# Patient Record
Sex: MALE | Race: WHITE | NOT HISPANIC OR LATINO | Employment: OTHER | URBAN - NONMETROPOLITAN AREA
[De-identification: names, ages, dates, MRNs, and addresses within clinical notes are randomized per-mention and may not be internally consistent; named-entity substitution may affect disease eponyms.]

---

## 2017-04-10 DIAGNOSIS — I10 HYPERTENSION, GOAL BELOW 140/90: ICD-10-CM

## 2017-04-10 RX ORDER — LISINOPRIL 20 MG/1
20 TABLET ORAL 2 TIMES DAILY
Qty: 180 TABLET | Refills: 1 | Status: SHIPPED | OUTPATIENT
Start: 2017-04-10 | End: 2018-02-19

## 2017-04-12 ENCOUNTER — OFFICE VISIT (OUTPATIENT)
Dept: FAMILY MEDICINE | Facility: CLINIC | Age: 62
End: 2017-04-12
Payer: COMMERCIAL

## 2017-04-12 VITALS
SYSTOLIC BLOOD PRESSURE: 126 MMHG | WEIGHT: 315 LBS | DIASTOLIC BLOOD PRESSURE: 82 MMHG | HEART RATE: 65 BPM | BODY MASS INDEX: 38.36 KG/M2 | TEMPERATURE: 97.9 F | HEIGHT: 76 IN

## 2017-04-12 DIAGNOSIS — I10 ESSENTIAL HYPERTENSION, BENIGN: Primary | ICD-10-CM

## 2017-04-12 PROCEDURE — 99214 OFFICE O/P EST MOD 30 MIN: CPT | Performed by: FAMILY MEDICINE

## 2017-04-12 RX ORDER — CARVEDILOL 6.25 MG/1
6.25 TABLET ORAL 2 TIMES DAILY WITH MEALS
Qty: 60 TABLET | Refills: 5 | Status: SHIPPED | OUTPATIENT
Start: 2017-04-12 | End: 2017-04-13

## 2017-04-12 RX ORDER — CARVEDILOL 6.25 MG/1
6.25 TABLET ORAL 2 TIMES DAILY WITH MEALS
COMMUNITY
End: 2017-04-12

## 2017-04-12 RX ORDER — CARVEDILOL 12.5 MG/1
18.75 TABLET ORAL 2 TIMES DAILY WITH MEALS
Qty: 180 TABLET | Refills: 5 | Status: SHIPPED | OUTPATIENT
Start: 2017-04-12 | End: 2017-04-14

## 2017-04-12 NOTE — NURSING NOTE
"Chief Complaint   Patient presents with     Hyperlipidemia     follow up and refiills       Initial /82 (BP Location: Right arm, Cuff Size: Adult Large)  Pulse 65  Temp 97.9  F (36.6  C) (Tympanic)  Ht 6' 4\" (1.93 m)  Wt (!) 338 lb (153.3 kg)  BMI 41.14 kg/m2 Estimated body mass index is 41.14 kg/(m^2) as calculated from the following:    Height as of this encounter: 6' 4\" (1.93 m).    Weight as of this encounter: 338 lb (153.3 kg).  Medication Reconciliation: complete   Krysten Serna / Certified Medical Assistant......4/12/2017 8:16 AM    "

## 2017-04-12 NOTE — PATIENT INSTRUCTIONS
1. Your BP is great.  Same meds    2. Follow the value with cardiology once a year.  Dr Rosa is good.     3. Call for any other refills.     4. You are doing well. Work on weigh.  =

## 2017-04-12 NOTE — MR AVS SNAPSHOT
"              After Visit Summary   4/12/2017    Stu Kee    MRN: [id number]           Patient Information     Date Of Birth          1955        Visit Information        Provider Department      4/12/2017 8:00 AM Jordan Stevenson MD [facility]        Today's Diagnoses     Essential hypertension, benign    -  1      Care Instructions    1. Your BP is great.  Same meds    2. Follow the value with cardiology once a year.  Dr Rosa is good.     3. Call for any other refills.     4. You are doing well. Work on weigh.          Follow-ups after your visit        Who to contact     If you have questions or need follow up information about today's clinic visit or your schedule please contact [facility] directly at [phone].  Normal or non-critical lab and imaging results will be communicated to you by MyChart, letter or phone within 4 business days after the clinic has received the results. If you do not hear from us within 7 days, please contact the clinic through MyChart or phone. If you have a critical or abnormal lab result, we will notify you by phone as soon as possible.  Submit refill requests through [employer] or call your pharmacy and they will forward the refill request to us. Please allow 3 business days for your refill to be completed.          Additional Information About Your Visit        [employer] Information     [employer] lets you send messages to your doctor, view your test results, renew your prescriptions, schedule appointments and more. To sign up, go to [email or web address] . Click on \"Log in\" on the left side of the screen, which will take you to the Welcome page. Then click on \"Sign up Now\" on the right side of the page.     You will be asked to enter the access code listed below, as well as some personal information. Please follow the directions to create your username and password.     Your access code is: [email or web address]  Expires: 7/11/2017  " "8:36 AM     Your access code will  in 90 days. If you need help or a new code, please call your [place] clinic or [phone].        Care EveryWhere ID     This is your Care EveryWhere ID. This could be used by other organizations to access your [facility] medical records  [id number]        Your Vitals Were     Pulse Temperature Height BMI (Body Mass Index)          65 97.9  F (36.6  C) (Tympanic) 6' 4\" (1.93 m) 41.14 kg/m2         Blood Pressure from Last 3 Encounters:   17 126/82   16 128/77   16 135/82    Weight from Last 3 Encounters:   17 (!) 338 lb (153.3 kg)   16 (!) 335 lb (152 kg)   16 (!) 336 lb 6.4 oz (152.6 kg)              Today, you had the following     No orders found for display         Today's Medication Changes          These changes are accurate as of: 17  8:36 AM.  If you have any questions, ask your nurse or doctor.               These medicines have changed or have updated prescriptions.        Dose/Directions    * carvedilol 6.25 MG tablet   Commonly known as:  COREG   This may have changed:  Another medication with the same name was changed. Make sure you understand how and when to take each.   Used for:  Essential hypertension, benign   Changed by:  Jordan Stevenson MD        Dose:  6.25 mg   Take 1 tablet (6.25 mg) by mouth 2 times daily (with meals) Takes with the 12.5 tab   Quantity:  60 tablet   Refills:  5       * carvedilol 12.5 MG tablet   Commonly known as:  COREG   This may have changed:  additional instructions   Used for:  Essential hypertension, benign   Changed by:  Jordan Stevenson MD        Dose:  18.75 mg   Take 1.5 tablets (18.75 mg) by mouth 2 times daily (with meals) Patient is taking a 12.5 tab and a 6.25 tab bid   Quantity:  180 tablet   Refills:  5       * Notice:  This list has 2 medication(s) that are the same as other medications prescribed for you. Read the directions carefully, and ask your " doctor or other care provider to review them with you.         Where to get your medicines      These medications were sent to [id number] - [place] [place] [facility] [address] [facility]  [address] , [facility] [address]     Phone:  [phone]     carvedilol 12.5 MG tablet    carvedilol 6.25 MG tablet                Primary Care Provider Office Phone # Fax #    Barrie Hope [phone]       [facility] [address] [facility] [address]        Thank you!     Thank you for choosing [facility]  for your care. Our goal is always to provide you with excellent care. Hearing back from our patients is one way we can continue to improve our services. Please take a few minutes to complete the written survey that you may receive in the mail after your visit with us. Thank you!             Your Updated Medication List - Protect others around you: Learn how to safely use, store and throw away your medicines at www.disposemymeds.org.          This list is accurate as of: 4/12/17  8:36 AM.  Always use your most recent med list.                   Brand Name Dispense Instructions for use    allopurinol 100 MG tablet    ZYLOPRIM    90 tablet    Take 1 tablet (100 mg) by mouth daily       ASPIRIN PO      Take 81 mg by mouth daily       * carvedilol 6.25 MG tablet    COREG    60 tablet    Take 1 tablet (6.25 mg) by mouth 2 times daily (with meals) Takes with the 12.5 tab       * carvedilol 12.5 MG tablet    COREG    180 tablet    Take 1.5 tablets (18.75 mg) by mouth 2 times daily (with meals) Patient is taking a 12.5 tab and a 6.25 tab bid       CENTRUM SILVER per tablet      Take 1 tablet by mouth daily       chlorthalidone 25 MG tablet    HYGROTON    90 tablet    Take 1 tablet (25 mg) by mouth daily       lisinopril 20 MG tablet    PRINIVIL/ZESTRIL    180 tablet    Take 1 tablet (20 mg) by mouth 2 times daily       simvastatin 20 MG tablet    ZOCOR    90 tablet    Take 1  tablet (20 mg) by mouth At Bedtime       * Notice:  This list has 2 medication(s) that are the same as other medications prescribed for you. Read the directions carefully, and ask your doctor or other care provider to review them with you.

## 2017-04-12 NOTE — PROGRESS NOTES
SUBJECTIVE:                                                    Stu Kee is a 61 year old male who presents to clinic today for the following health issues: This man comes in the 4 routine follow up of his hyperlipidemia and hypertension. He is status after a aortic valve replacement.  Currently not anticoagulated and has no signs of any ongoing atrial fibrillation which appeared to clear up after his valve replacement he does have a tissue valve in place. He's not seeing cardiology for some time.  Hyperlipidemia Follow-Up      Rate your low fat/cholesterol diet?: not monitoring fat    Taking statin?  Yes, no muscle aches from statin    Other lipid medications/supplements?:  none     Hypertension Follow-up      Outpatient blood pressures are not being checked.    Low Salt Diet: not monitoring salt       Amount of exercise or physical activity: 4-5 days/week for an average of greater than 60 minutes    Problems taking medications regularly: No-but does forget sometimes    Medication side effects: none    Diet: regular (no restrictions)          Problem list and histories reviewed & adjusted, as indicated.  Additional history: as documented    Patient Active Problem List   Diagnosis     CARDIOVASCULAR SCREENING; LDL GOAL LESS THAN 160     Advanced directives, counseling/discussion     Hypertension, goal below 140/90     Aortic valve replaced     Abscess of leg, left s/p I+D      Traumatic open wound of lower leg with infection, left, subsequent encounter     Acute idiopathic gout of left foot     Past Surgical History:   Procedure Laterality Date     APPENDECTOMY       ARTHROSCOPY KNEE RT/LT  age 36    Torn Men Left     LASIK         Social History   Substance Use Topics     Smoking status: Never Smoker     Smokeless tobacco: Never Used     Alcohol use Yes      Comment: 1 drink/month     Family History   Problem Relation Age of Onset     HEART DISEASE Mother      HEART DISEASE Father      valve replaced      CANCER Father      CEREBROVASCULAR DISEASE Maternal Grandmother      Arrhythmia Maternal Grandmother      pacemaker     Arrhythmia Sister      pacemaker     DIABETES Sister      Hypertension Daughter      HEART DISEASE Other      Lung Cancer Sister          Current Outpatient Prescriptions   Medication Sig Dispense Refill     carvedilol (COREG) 6.25 MG tablet Take 1 tablet (6.25 mg) by mouth 2 times daily (with meals) Takes with the 12.5 tab 60 tablet 5     carvedilol (COREG) 12.5 MG tablet Take 1.5 tablets (18.75 mg) by mouth 2 times daily (with meals) Patient is taking a 12.5 tab and a 6.25 tab bid 180 tablet 5     lisinopril (PRINIVIL/ZESTRIL) 20 MG tablet Take 1 tablet (20 mg) by mouth 2 times daily 180 tablet 1     chlorthalidone (HYGROTON) 25 MG tablet Take 1 tablet (25 mg) by mouth daily 90 tablet 3     simvastatin (ZOCOR) 20 MG tablet Take 1 tablet (20 mg) by mouth At Bedtime 90 tablet 3     ASPIRIN PO Take 81 mg by mouth daily        Multiple Vitamins-Minerals (CENTRUM SILVER) per tablet Take 1 tablet by mouth daily       [DISCONTINUED] carvedilol (COREG) 6.25 MG tablet Take 6.25 mg by mouth 2 times daily (with meals) Takes with the 12.5 tab       allopurinol (ZYLOPRIM) 100 MG tablet Take 1 tablet (100 mg) by mouth daily 90 tablet 1     [DISCONTINUED] carvedilol (COREG) 12.5 MG tablet Take 1.5 tablets (18.75 mg) by mouth 2 times daily (with meals) (Patient taking differently: Take 18.75 mg by mouth 2 times daily (with meals) Patient is taking a 12.5 tab and a 6.25 tab bid) 180 tablet 5     Allergies   Allergen Reactions     Amlodipine Besylate Swelling     Lisinopril        Reviewed and updated as needed this visit by clinical staff       Reviewed and updated as needed this visit by Provider         ROS:  C: NEGATIVE for fever, chills, change in weight  E/M: NEGATIVE for ear, mouth and throat problems  R: NEGATIVE for significant cough or SOB  CV: NEGATIVE for chest pain, palpitations or peripheral  "edema    OBJECTIVE:                                                    /82 (BP Location: Right arm, Cuff Size: Adult Large)  Pulse 65  Temp 97.9  F (36.6  C) (Tympanic)  Ht 6' 4\" (1.93 m)  Wt (!) 338 lb (153.3 kg)  BMI 41.14 kg/m2  Body mass index is 41.14 kg/(m^2).  GENERAL: healthy, alert and no distress  NECK: no adenopathy, no asymmetry, masses, or scars and thyroid normal to palpation  RESP: lungs clear to auscultation - no rales, rhonchi or wheezes  CV: He does have a systolic murmur.  ABDOMEN: soft, nontender, no hepatosplenomegaly, no masses and bowel sounds normal  MS: no gross musculoskeletal defects noted, no edema         ASSESSMENT/PLAN:                                                            1. Essential hypertension, benign  Blood pressure is under good control.  - carvedilol (COREG) 6.25 MG tablet; Take 1 tablet (6.25 mg) by mouth 2 times daily (with meals) Takes with the 12.5 tab  Dispense: 60 tablet; Refill: 5  - carvedilol (COREG) 12.5 MG tablet; Take 1.5 tablets (18.75 mg) by mouth 2 times daily (with meals) Patient is taking a 12.5 tab and a 6.25 tab bid  Dispense: 180 tablet; Refill: 5  ASSESSMENT/PLAN:      ICD-10-CM    1. Essential hypertension, benign I10 carvedilol (COREG) 6.25 MG tablet     carvedilol (COREG) 12.5 MG tablet       Patient Instructions   1. Your BP is great.  Same meds    2. Follow the value with cardiology once a year.  Dr Rosa is good.     3. Call for any other refills.     4. You are doing well. Work on weigh.  =              Jordan Stevenson MD  [facility]  "

## 2017-04-13 DIAGNOSIS — I10 ESSENTIAL HYPERTENSION, BENIGN: ICD-10-CM

## 2017-04-13 RX ORDER — CARVEDILOL 6.25 MG/1
6.25 TABLET ORAL 2 TIMES DAILY WITH MEALS
Qty: 180 TABLET | Refills: 1 | Status: SHIPPED | OUTPATIENT
Start: 2017-04-13 | End: 2017-12-06

## 2017-04-13 NOTE — TELEPHONE ENCOUNTER
Coreg 6.25 mg tab one BID refilled (takes along with coreg 12.5 mg BID)  Pt requesting new Rx for coreg 6.25 mg qty 180.  Rx sent to laron Houser RN

## 2017-04-14 DIAGNOSIS — I10 ESSENTIAL HYPERTENSION, BENIGN: ICD-10-CM

## 2017-04-14 RX ORDER — CARVEDILOL 12.5 MG/1
TABLET ORAL
Qty: 180 TABLET | Refills: 1 | Status: SHIPPED | OUTPATIENT
Start: 2017-04-14 | End: 2018-06-07

## 2017-05-08 DIAGNOSIS — I10 HYPERTENSION, GOAL BELOW 140/90: ICD-10-CM

## 2017-05-08 DIAGNOSIS — E78.5 HYPERLIPIDEMIA LDL GOAL <160: ICD-10-CM

## 2017-05-08 RX ORDER — CHLORTHALIDONE 25 MG/1
25 TABLET ORAL DAILY
Qty: 90 TABLET | Refills: 1 | Status: SHIPPED | OUTPATIENT
Start: 2017-05-08 | End: 2018-04-13

## 2017-05-08 RX ORDER — SIMVASTATIN 20 MG
20 TABLET ORAL AT BEDTIME
Qty: 90 TABLET | Refills: 0 | Status: SHIPPED | OUTPATIENT
Start: 2017-05-08 | End: 2017-12-06

## 2017-05-08 NOTE — TELEPHONE ENCOUNTER
chlorthalidone (HYGROTON) 25 MG tablet      Last Written Prescription Date: 7/20/16  Last Fill Quantity: 90, # refills: 3  Last Office Visit with [facility] or [facility] prescribing provider: 4/12/17       Potassium   Date Value Ref Range Status   09/16/2016 3.6 3.4 - 5.3 mmol/L Final     Creatinine   Date Value Ref Range Status   09/16/2016 1.11 0.66 - 1.25 mg/dL Final     BP Readings from Last 3 Encounters:   04/12/17 126/82   12/20/16 128/77   11/16/16 135/82     simvastatin (ZOCOR) 20 MG tablet     Last Written Prescription Date: 7/20/16  Last Fill Quantity: 90, # refills: 3  Last Office Visit with [facility] or [facility] prescribing provider: 4/12/17       No results found for: CHOL  No results found for: HDL  No results found for: LDL  No results found for: TRIG  No results found for: CHOLHDLRATIO

## 2017-05-10 ENCOUNTER — TELEPHONE (OUTPATIENT)
Dept: FAMILY MEDICINE | Facility: CLINIC | Age: 62
End: 2017-05-10

## 2017-05-10 NOTE — TELEPHONE ENCOUNTER
Panel Management Review      Patient has the following on his problem list:       IVD   ASA: Passed    Last LDL:    No results found for: CHOL  No results found for: HDL  No results found for: LDL  No results found for: TRIG   No results found for: CHOLHDLRATIO     Is the patient on a Statin? YES   Is the patient on Aspirin? YES                  Medications     HMG CoA Reductase Inhibitors    simvastatin (ZOCOR) 20 MG tablet    Salicylates    ASPIRIN PO          Last three blood pressure readings:  BP Readings from Last 3 Encounters:   04/12/17 126/82   12/20/16 128/77   11/16/16 135/82        Tobacco History:     History   Smoking Status     Never Smoker   Smokeless Tobacco     Never Used       Hypertension   Last three blood pressure readings:  BP Readings from Last 3 Encounters:   04/12/17 126/82   12/20/16 128/77   11/16/16 135/82     Blood pressure: Passed    HTN Guidelines:  Age 18-59 BP range:  Less than 140/90  Age 60-85 with Diabetes:  Less than 140/90  Age 60-85 without Diabetes:  less than 150/90      Composite cancer screening  Chart review shows that this patient is due/due soon for the following Colonoscopy  Summary:    Patient is due/failing the following:   COLONOSCOPY    Action needed:   Patient needs office visit for colonoscopy.    Type of outreach:    Sent letter.    Questions for provider review:    None                                                                                                                                    Anson Flower CMA       Chart routed to closed .

## 2017-05-10 NOTE — LETTER
[facility]  [address] [facility] [address]  Phone: [phone]    05/10/17    Stu Kee  [address] [facility] [address]      To whom it may concern:     Our records indicate that you have not scheduled for a(n)colonoscopy which was recommended by your health care team. Monitoring and managing your preventative and chronic health conditions are very important to us.     If you have received your health care elsewhere, please provide us with that information so it can be documented in your chart.    Please call [phone] or message us through your [employer] account to schedule an appointment or provide information for your chart.     I look forward to seeing you and working with you on your health care needs.       *If you have already scheduled an appointment, please disregard this reminder      Sincerely,      Barrie Hope MD/ghanshyam

## 2017-07-25 ENCOUNTER — OFFICE VISIT (OUTPATIENT)
Dept: FAMILY MEDICINE | Facility: CLINIC | Age: 62
End: 2017-07-25
Payer: COMMERCIAL

## 2017-07-25 ENCOUNTER — RADIANT APPOINTMENT (OUTPATIENT)
Dept: GENERAL RADIOLOGY | Facility: CLINIC | Age: 62
End: 2017-07-25
Attending: FAMILY MEDICINE
Payer: COMMERCIAL

## 2017-07-25 VITALS
WEIGHT: 315 LBS | HEIGHT: 76 IN | SYSTOLIC BLOOD PRESSURE: 112 MMHG | TEMPERATURE: 98.7 F | DIASTOLIC BLOOD PRESSURE: 72 MMHG | OXYGEN SATURATION: 95 % | HEART RATE: 64 BPM | BODY MASS INDEX: 38.36 KG/M2

## 2017-07-25 DIAGNOSIS — M25.561 ACUTE PAIN OF RIGHT KNEE: ICD-10-CM

## 2017-07-25 DIAGNOSIS — I83.893 VARICOSE VEINS OF BOTH LEGS WITH EDEMA: ICD-10-CM

## 2017-07-25 DIAGNOSIS — M25.561 ACUTE PAIN OF RIGHT KNEE: Primary | ICD-10-CM

## 2017-07-25 PROCEDURE — 99213 OFFICE O/P EST LOW 20 MIN: CPT | Performed by: FAMILY MEDICINE

## 2017-07-25 PROCEDURE — 73560 X-RAY EXAM OF KNEE 1 OR 2: CPT | Mod: RT

## 2017-07-25 NOTE — PROGRESS NOTES
SUBJECTIVE:                                                    Stu Kee is a 61 year old male who presents to clinic today for the following health issues:    Musculoskeletal problem/pain      Duration: 5 days     Description  Location: right knee, no history of trauma/fall or injury,  by profession, stands for long hours during work,     Intensity:  Moderate, 4/10 today     Accompanying signs and symptoms: just hurts     History  Previous similar problem: no   Previous evaluation:  none    Precipitating or alleviating factors:  Trauma or overuse: YES, overuse   Aggravating factors include: standing, walking and climbing stairs    Therapies tried and outcome: ibuprofen with some relief       Problem list and histories reviewed & adjusted, as indicated.      Additional history: as documented    Patient Active Problem List   Diagnosis     CARDIOVASCULAR SCREENING; LDL GOAL LESS THAN 160     Advanced directives, counseling/discussion     Hypertension, goal below 140/90     Aortic valve replaced     Abscess of leg, left s/p I+D      Traumatic open wound of lower leg with infection, left, subsequent encounter     Acute idiopathic gout of left foot     Past Surgical History:   Procedure Laterality Date     APPENDECTOMY       ARTHROSCOPY KNEE RT/LT  age 36    Torn Men Left     LASIK         Social History   Substance Use Topics     Smoking status: Never Smoker     Smokeless tobacco: Never Used     Alcohol use Yes      Comment: 1 drink/month     Family History   Problem Relation Age of Onset     HEART DISEASE Mother      HEART DISEASE Father      valve replaced     CANCER Father      CEREBROVASCULAR DISEASE Maternal Grandmother      Arrhythmia Maternal Grandmother      pacemaker     Arrhythmia Sister      pacemaker     DIABETES Sister      Hypertension Daughter      HEART DISEASE Other      Lung Cancer Sister          Current Outpatient Prescriptions   Medication Sig Dispense Refill     chlorthalidone  "(HYGROTON) 25 MG tablet Take 1 tablet (25 mg) by mouth daily 90 tablet 1     simvastatin (ZOCOR) 20 MG tablet Take 1 tablet (20 mg) by mouth At Bedtime 90 tablet 0     carvedilol (COREG) 12.5 MG tablet take 1 tablet 12.5 mg by mouth twice daily and take 1 tablet 6.25 mg twice daily 180 tablet 1     carvedilol (COREG) 6.25 MG tablet Take 1 tablet (6.25 mg) by mouth 2 times daily (with meals) Takes with the 12.5 tab 180 tablet 1     lisinopril (PRINIVIL/ZESTRIL) 20 MG tablet Take 1 tablet (20 mg) by mouth 2 times daily 180 tablet 1     allopurinol (ZYLOPRIM) 100 MG tablet Take 1 tablet (100 mg) by mouth daily 90 tablet 1     ASPIRIN PO Take 81 mg by mouth daily        Multiple Vitamins-Minerals (CENTRUM SILVER) per tablet Take 1 tablet by mouth daily       Allergies   Allergen Reactions     Amlodipine Besylate Swelling     Lisinopril      Recent Labs   Lab Test  09/16/16   1038  09/12/16   0915   07/23/16   2125   ALT   --    --    --   42   CR  1.11  1.11   < >  1.11   GFRESTIMATED  67  67   < >  67   GFRESTBLACK  81  81   < >  81   POTASSIUM  3.6  3.2*   < >  3.1*    < > = values in this interval not displayed.      BP Readings from Last 3 Encounters:   07/25/17 112/72   04/12/17 126/82   12/20/16 128/77    Wt Readings from Last 3 Encounters:   07/25/17 (!) 341 lb (154.7 kg)   04/12/17 (!) 338 lb (153.3 kg)   12/20/16 (!) 335 lb (152 kg)                  Labs reviewed in EPIC          Reviewed and updated as needed this visit by clinical staff     Reviewed and updated as needed this visit by Provider         ROS:  Constitutional, HEENT, cardiovascular, pulmonary, gi and gu systems are negative, except as otherwise noted.      OBJECTIVE:   /72  Pulse 64  Temp 98.7  F (37.1  C) (Tympanic)  Ht 6' 4\" (1.93 m)  Wt (!) 341 lb (154.7 kg)  SpO2 95%  BMI 41.51 kg/m2  Body mass index is 41.51 kg/(m^2).  GENERAL: healthy, alert and no distress  NECK: no adenopathy, no asymmetry, masses, or scars and thyroid normal " to palpation  RESP: lungs clear to auscultation - no rales, rhonchi or wheezes  CV: regular rate and rhythm, normal S1 S2, no S3 or S4, no murmur, click or rub, no peripheral edema and peripheral pulses strong  ABDOMEN: soft, nontender, no hepatosplenomegaly, no masses and bowel sounds normal  MS: osteoarthritic changes involving both knees, no significant swelling, tenderness or joint laxity noted, varicose veins and pedal edema 1+ bilaterally, sensation to touch and pressure intact, onychomycosis involving toes bilaterally, pedal pulses 3+      ASSESSMENT/PLAN:         ICD-10-CM    1. Acute pain of right knee M25.561 CANCELED: XR Knee Right 3 Views   2. Varicose veins of both legs with edema I83.893        Suspect symptoms likely secondary to right knee sprain and osteoarthritis, x-ray finding discussed, official report pending. Differentials discussed including gout and infection. RICE therapy recommended and suggested over-the-counter analgesia. We'll consider physical therapy/MRI if symptoms persist or worsen. Patient understood and in agreement with the above plan. All questions answered.      Patient Instructions       Knee Sprain    A sprain is an injury to the ligaments or capsule that holds a joint together. There are no broken bones. Most sprains take 3 to 6 weeks to heal. If it a severe sprain where the ligament is completely torn, it can take months to recover.  Most knee sprains are treated with a splint, knee immobilizer brace, or elastic wrap for support. Severe sprains may require surgery.  Home care    Stay off the injured leg as much as possible until you can walk on it without pain. If you have a lot of pain with walking, crutches or a walker may be prescribed. (These can be rented or purchased at many pharmacies and surgical or orthopedic supply stores). Follow your healthcare provider's advice about when to begin putting weight on that leg.    Keep your leg elevated to reduce pain and swelling.  When sleeping, place a pillow under the injured leg. When sitting, support the injured leg so it is level with your waist. This is very important during the first 48 hours.    Apply an ice pack over the injured area for 15 to 20 minutes every 3 to 6 hours. You should do this for the first 24 to 48 hours. You can make an ice pack by filling a plastic bag that seals at the top with ice cubes and then wrapping it with a thin towel. Continue to use ice packs for relief of pain and swelling as needed. As the ice melts, be careful to avoid getting your wrap, splint, or cast wet. After 48 hours, apply heat (warm shower or warm bath) for 15 to 20 minutes several times a day, or alternate ice and heat. You can place the ice pack directly over the splint. If you have to wear a hook-and-loop knee brace, you can open it to apply the ice pack, or heat, directly to the knee. Never put ice directly on the skin. Always wrap the ice in a towel or other type of cloth.    You may use over-the-counter pain medicine to control pain, unless another pain medicine was prescribed.If you have chronic liver or kidney disease or ever had a stomach ulcer or GI bleeding, talk with your healthcare provider before using these medicines.    If you were given a splint, keep it completely dry at all times. Bathe with your splint out of the water, protected with 2 large plastic bags, rubber-banded at the top end. If a fiberglass splint gets wet, you can dry it with a hair dryer. If you have a hook-and-loop knee brace, you can remove this to bathe, unless told otherwise.  Follow-up care  Follow up with your doctor as advised. Any X-rays you had today don t show any broken bones, breaks, or fractures. Sometimes fractures don t show up on the first X-ray. Bruises and sprains can sometimes hurt as much as a fracture. These injuries can take time to heal completely. If your symptoms don t improve or they get worse, talk with your doctor. You may need a  repeat X-ray. If X-rays were taken, you will be told of any new findings that may affect your care.  Call 911  Call 911 if you have:     Shortness of breath     Chest pain  When to seek medical advice  Call your healthcare provider right away if any of these occur:    The splint or knee immobilizer brace becomes wet or soft    The fiberglass cast or splint remains wet for more than 24 hours    Pain or swelling increases    The injured leg or toes become cold, blue, numb, or tingly  Date Last Reviewed: 11/20/2015 2000-2017 [employer]. [address] [place] [address]. All rights reserved. This information is not intended as a substitute for professional medical care. Always follow your healthcare professional's instructions.        Osteoarthritis  Osteoarthritis (also called degenerative joint disease) happens when the cartilage in a joint becomes damaged and worn. This may be due to age, wear and tear, overuse of the joint, or other problems. Osteoarthritis can affect any joint. But it is most common in hands, knees, spine, hips, and feet. Symptoms include joint stiffness, pain, and swelling.  Home care    When a joint is more sore than usual, rest it for a day or two.    Heat can help relieve stiffness. Take a hot bath or apply a heating pad for up to 30 minutes at a time. If symptoms are worse in the morning, using heat just after awakening can help relax the muscle and soothe the joints.     Ice helps relieve pain and swelling. It is often used after activity. Use a cold pack wrapped in a thin cloth on the joint for 10-15 minutes at a time.     Alternating hot and cold can also help relieve pain. Try this for 20 minutes at a time, several times per day.    Exercise helps prevent the muscles and ligaments around the joint from becoming weak. It also helps maintain function in the joint.  Be as active as you can. Talk to your doctor about what activity program is best for you.    Excess  weight puts a lot of extra strain on weight-bearing joints of the lower back, hips, knees, feet and ankles. If you are overweight, talk to your doctor about a safe and effective weight loss program.    Use anti-inflammatory medications as prescribed for pain. This incudes acetaminophen or NSAIDs such as ibuprofen or naproxen. If needed, topical or injected medications may be recommended. Talk to your doctor if these options are not enough to manage your pain.    Talk with your doctor about devices that might help improve your function and reduce pain.  Follow-up care  Follow up with your doctor as advised by our staff.  When to seek medical attention  Call your doctor right away if any of the following occur:    Redness or swelling of a painful joint    Discharge or pus from a painful joint    Fever of 100.4 F (38 C) or higher, or as directed by your healthcare provider    Worsening joint pain    Decreased ability to move the joint or bear weight on the joint  Date Last Reviewed: 4/13/2015 2000-2017 [employer]. [address] [place] [address]. All rights reserved. This information is not intended as a substitute for professional medical care. Always follow your healthcare professional's instructions.        RICE     Rest an injury, elevate it, and use ice and compression as directed.   RICE stands for rest, ice, compression, and elevation. These can limit pain and swelling after an injury. RICE may be recommended to help treat fractures, sprains, strains, and bruises or bumps.   Home care  The following explain the details of RICE:    Rest. Limit the use of the injured body part. This helps prevent further damage to the body part and gives it time to heal. In some cases, you may need a sling, brace, splint, or cast to help keep the body part still until it has healed.    Ice. Applying ice right after an injury helps relieve pain and swelling. Wrap a bag of ice in a thin towel. Then, place  it over the injured area. Do this for 10 to 15 minutes every 3 to 4 hours. Continue for the next 1 to 3 days or until your symptoms improve. Never put ice directly on your skin or ice an area longer than 15 minutes at a time.    Compression. Putting pressure on an injury helps reduce swelling and provides support. Wrap the injured area firmly with an elastic bandage/wrap. Make sure not to wrap the bandage too tightly or you will cut off blood flow to the injured area. If your bandage loosens, rewrap it.    Elevation. Keeping an injury raised above the level of your heart reduces swelling, pain, and throbbing. For instance, if you have a broken leg, it may help to rest your leg on several pillows when sitting or lying down. Try to keep the injured area elevated for at least 2 to 3 hours per day.  Follow-up care  Follow up with your healthcare provider, or as advised.  When to seek medical advice  Call your healthcare provider right away if any of these occur:    Fever of 100.4 F (38 C) or higher, or as directed by your healthcare provider    Increased pain or swelling in the injured body part    Injured body part becomes cold, blue, numb, or tingly    Signs of infection. These include warmth in the skin, redness, drainage, or bad smell coming from the injured body part.  Date Last Reviewed: 1/18/2016 2000-2017 [employer]. [address] [place] [id number]. All rights reserved. This information is not intended as a substitute for professional medical care. Always follow your healthcare professional's instructions.            Orlando Robledo MD  [facility]

## 2017-07-25 NOTE — PATIENT INSTRUCTIONS
Knee Sprain    A sprain is an injury to the ligaments or capsule that holds a joint together. There are no broken bones. Most sprains take 3 to 6 weeks to heal. If it a severe sprain where the ligament is completely torn, it can take months to recover.  Most knee sprains are treated with a splint, knee immobilizer brace, or elastic wrap for support. Severe sprains may require surgery.  Home care    Stay off the injured leg as much as possible until you can walk on it without pain. If you have a lot of pain with walking, crutches or a walker may be prescribed. (These can be rented or purchased at many pharmacies and surgical or orthopedic supply stores). Follow your healthcare provider's advice about when to begin putting weight on that leg.    Keep your leg elevated to reduce pain and swelling. When sleeping, place a pillow under the injured leg. When sitting, support the injured leg so it is level with your waist. This is very important during the first 48 hours.    Apply an ice pack over the injured area for 15 to 20 minutes every 3 to 6 hours. You should do this for the first 24 to 48 hours. You can make an ice pack by filling a plastic bag that seals at the top with ice cubes and then wrapping it with a thin towel. Continue to use ice packs for relief of pain and swelling as needed. As the ice melts, be careful to avoid getting your wrap, splint, or cast wet. After 48 hours, apply heat (warm shower or warm bath) for 15 to 20 minutes several times a day, or alternate ice and heat. You can place the ice pack directly over the splint. If you have to wear a hook-and-loop knee brace, you can open it to apply the ice pack, or heat, directly to the knee. Never put ice directly on the skin. Always wrap the ice in a towel or other type of cloth.    You may use over-the-counter pain medicine to control pain, unless another pain medicine was prescribed.If you have chronic liver or kidney disease or ever had a stomach  ulcer or GI bleeding, talk with your healthcare provider before using these medicines.    If you were given a splint, keep it completely dry at all times. Bathe with your splint out of the water, protected with 2 large plastic bags, rubber-banded at the top end. If a fiberglass splint gets wet, you can dry it with a hair dryer. If you have a hook-and-loop knee brace, you can remove this to bathe, unless told otherwise.  Follow-up care  Follow up with your doctor as advised. Any X-rays you had today don t show any broken bones, breaks, or fractures. Sometimes fractures don t show up on the first X-ray. Bruises and sprains can sometimes hurt as much as a fracture. These injuries can take time to heal completely. If your symptoms don t improve or they get worse, talk with your doctor. You may need a repeat X-ray. If X-rays were taken, you will be told of any new findings that may affect your care.  Call 911  Call 911 if you have:     Shortness of breath     Chest pain  When to seek medical advice  Call your healthcare provider right away if any of these occur:    The splint or knee immobilizer brace becomes wet or soft    The fiberglass cast or splint remains wet for more than 24 hours    Pain or swelling increases    The injured leg or toes become cold, blue, numb, or tingly  Date Last Reviewed: 11/20/2015 2000-2017 [employer]. [address] [place], [place] [id number]. All rights reserved. This information is not intended as a substitute for professional medical care. Always follow your healthcare professional's instructions.        Osteoarthritis  Osteoarthritis (also called degenerative joint disease) happens when the cartilage in a joint becomes damaged and worn. This may be due to age, wear and tear, overuse of the joint, or other problems. Osteoarthritis can affect any joint. But it is most common in hands, knees, spine, hips, and feet. Symptoms include joint stiffness, pain, and  swelling.  Home care    When a joint is more sore than usual, rest it for a day or two.    Heat can help relieve stiffness. Take a hot bath or apply a heating pad for up to 30 minutes at a time. If symptoms are worse in the morning, using heat just after awakening can help relax the muscle and soothe the joints.     Ice helps relieve pain and swelling. It is often used after activity. Use a cold pack wrapped in a thin cloth on the joint for 10-15 minutes at a time.     Alternating hot and cold can also help relieve pain. Try this for 20 minutes at a time, several times per day.    Exercise helps prevent the muscles and ligaments around the joint from becoming weak. It also helps maintain function in the joint.  Be as active as you can. Talk to your doctor about what activity program is best for you.    Excess weight puts a lot of extra strain on weight-bearing joints of the lower back, hips, knees, feet and ankles. If you are overweight, talk to your doctor about a safe and effective weight loss program.    Use anti-inflammatory medications as prescribed for pain. This incudes acetaminophen or NSAIDs such as ibuprofen or naproxen. If needed, topical or injected medications may be recommended. Talk to your doctor if these options are not enough to manage your pain.    Talk with your doctor about devices that might help improve your function and reduce pain.  Follow-up care  Follow up with your doctor as advised by our staff.  When to seek medical attention  Call your doctor right away if any of the following occur:    Redness or swelling of a painful joint    Discharge or pus from a painful joint    Fever of 100.4 F (38 C) or higher, or as directed by your healthcare provider    Worsening joint pain    Decreased ability to move the joint or bear weight on the joint  Date Last Reviewed: 4/13/2015 2000-2017 [employer]. [address] [place] [address], [place] [address]. All rights reserved. This information is  not intended as a substitute for professional medical care. Always follow your healthcare professional's instructions.        RICE     Rest an injury, elevate it, and use ice and compression as directed.   RICE stands for rest, ice, compression, and elevation. These can limit pain and swelling after an injury. RICE may be recommended to help treat fractures, sprains, strains, and bruises or bumps.   Home care  The following explain the details of RICE:    Rest. Limit the use of the injured body part. This helps prevent further damage to the body part and gives it time to heal. In some cases, you may need a sling, brace, splint, or cast to help keep the body part still until it has healed.    Ice. Applying ice right after an injury helps relieve pain and swelling. Wrap a bag of ice in a thin towel. Then, place it over the injured area. Do this for 10 to 15 minutes every 3 to 4 hours. Continue for the next 1 to 3 days or until your symptoms improve. Never put ice directly on your skin or ice an area longer than 15 minutes at a time.    Compression. Putting pressure on an injury helps reduce swelling and provides support. Wrap the injured area firmly with an elastic bandage/wrap. Make sure not to wrap the bandage too tightly or you will cut off blood flow to the injured area. If your bandage loosens, rewrap it.    Elevation. Keeping an injury raised above the level of your heart reduces swelling, pain, and throbbing. For instance, if you have a broken leg, it may help to rest your leg on several pillows when sitting or lying down. Try to keep the injured area elevated for at least 2 to 3 hours per day.  Follow-up care  Follow up with your healthcare provider, or as advised.  When to seek medical advice  Call your healthcare provider right away if any of these occur:    Fever of 100.4 F (38 C) or higher, or as directed by your healthcare provider    Increased pain or swelling in the injured body part    Injured body  part becomes cold, blue, numb, or tingly    Signs of infection. These include warmth in the skin, redness, drainage, or bad smell coming from the injured body part.  Date Last Reviewed: 1/18/2016 2000-2017 [employer]. [address] [place] [address]. All rights reserved. This information is not intended as a substitute for professional medical care. Always follow your healthcare professional's instructions.

## 2017-07-25 NOTE — MR AVS SNAPSHOT
After Visit Summary   7/25/2017    Stu Kee    MRN: [id number]           Patient Information     Date Of Birth          1955        Visit Information        Provider Department      7/25/2017 2:00 PM Orlando Robledo MD [facility]        Today's Diagnoses     Acute pain of right knee    -  1    Varicose veins of both legs with edema          Care Instructions      Knee Sprain    A sprain is an injury to the ligaments or capsule that holds a joint together. There are no broken bones. Most sprains take 3 to 6 weeks to heal. If it a severe sprain where the ligament is completely torn, it can take months to recover.  Most knee sprains are treated with a splint, knee immobilizer brace, or elastic wrap for support. Severe sprains may require surgery.  Home care    Stay off the injured leg as much as possible until you can walk on it without pain. If you have a lot of pain with walking, crutches or a walker may be prescribed. (These can be rented or purchased at many pharmacies and surgical or orthopedic supply stores). Follow your healthcare provider's advice about when to begin putting weight on that leg.    Keep your leg elevated to reduce pain and swelling. When sleeping, place a pillow under the injured leg. When sitting, support the injured leg so it is level with your waist. This is very important during the first 48 hours.    Apply an ice pack over the injured area for 15 to 20 minutes every 3 to 6 hours. You should do this for the first 24 to 48 hours. You can make an ice pack by filling a plastic bag that seals at the top with ice cubes and then wrapping it with a thin towel. Continue to use ice packs for relief of pain and swelling as needed. As the ice melts, be careful to avoid getting your wrap, splint, or cast wet. After 48 hours, apply heat (warm shower or warm bath) for 15 to 20 minutes several times a day, or alternate ice and heat. You can place the ice pack  directly over the splint. If you have to wear a hook-and-loop knee brace, you can open it to apply the ice pack, or heat, directly to the knee. Never put ice directly on the skin. Always wrap the ice in a towel or other type of cloth.    You may use over-the-counter pain medicine to control pain, unless another pain medicine was prescribed.If you have chronic liver or kidney disease or ever had a stomach ulcer or GI bleeding, talk with your healthcare provider before using these medicines.    If you were given a splint, keep it completely dry at all times. Bathe with your splint out of the water, protected with 2 large plastic bags, rubber-banded at the top end. If a fiberglass splint gets wet, you can dry it with a hair dryer. If you have a hook-and-loop knee brace, you can remove this to bathe, unless told otherwise.  Follow-up care  Follow up with your doctor as advised. Any X-rays you had today don t show any broken bones, breaks, or fractures. Sometimes fractures don t show up on the first X-ray. Bruises and sprains can sometimes hurt as much as a fracture. These injuries can take time to heal completely. If your symptoms don t improve or they get worse, talk with your doctor. You may need a repeat X-ray. If X-rays were taken, you will be told of any new findings that may affect your care.  Call 911  Call 911 if you have:     Shortness of breath     Chest pain  When to seek medical advice  Call your healthcare provider right away if any of these occur:    The splint or knee immobilizer brace becomes wet or soft    The fiberglass cast or splint remains wet for more than 24 hours    Pain or swelling increases    The injured leg or toes become cold, blue, numb, or tingly  Date Last Reviewed: 11/20/2015 2000-2017 [employer]. [address] [place] [address], [place] [address]. All rights reserved. This information is not intended as a substitute for professional medical care. Always follow your healthcare  professional's instructions.        Osteoarthritis  Osteoarthritis (also called degenerative joint disease) happens when the cartilage in a joint becomes damaged and worn. This may be due to age, wear and tear, overuse of the joint, or other problems. Osteoarthritis can affect any joint. But it is most common in hands, knees, spine, hips, and feet. Symptoms include joint stiffness, pain, and swelling.  Home care    When a joint is more sore than usual, rest it for a day or two.    Heat can help relieve stiffness. Take a hot bath or apply a heating pad for up to 30 minutes at a time. If symptoms are worse in the morning, using heat just after awakening can help relax the muscle and soothe the joints.     Ice helps relieve pain and swelling. It is often used after activity. Use a cold pack wrapped in a thin cloth on the joint for 10-15 minutes at a time.     Alternating hot and cold can also help relieve pain. Try this for 20 minutes at a time, several times per day.    Exercise helps prevent the muscles and ligaments around the joint from becoming weak. It also helps maintain function in the joint.  Be as active as you can. Talk to your doctor about what activity program is best for you.    Excess weight puts a lot of extra strain on weight-bearing joints of the lower back, hips, knees, feet and ankles. If you are overweight, talk to your doctor about a safe and effective weight loss program.    Use anti-inflammatory medications as prescribed for pain. This incudes acetaminophen or NSAIDs such as ibuprofen or naproxen. If needed, topical or injected medications may be recommended. Talk to your doctor if these options are not enough to manage your pain.    Talk with your doctor about devices that might help improve your function and reduce pain.  Follow-up care  Follow up with your doctor as advised by our staff.  When to seek medical attention  Call your doctor right away if any of the following occur:    Redness or  swelling of a painful joint    Discharge or pus from a painful joint    Fever of 100.4 F (38 C) or higher, or as directed by your healthcare provider    Worsening joint pain    Decreased ability to move the joint or bear weight on the joint  Date Last Reviewed: 4/13/2015 2000-2017 [employer]. [address] [place] [address]. All rights reserved. This information is not intended as a substitute for professional medical care. Always follow your healthcare professional's instructions.        RICE     Rest an injury, elevate it, and use ice and compression as directed.   RICE stands for rest, ice, compression, and elevation. These can limit pain and swelling after an injury. RICE may be recommended to help treat fractures, sprains, strains, and bruises or bumps.   Home care  The following explain the details of RICE:    Rest. Limit the use of the injured body part. This helps prevent further damage to the body part and gives it time to heal. In some cases, you may need a sling, brace, splint, or cast to help keep the body part still until it has healed.    Ice. Applying ice right after an injury helps relieve pain and swelling. Wrap a bag of ice in a thin towel. Then, place it over the injured area. Do this for 10 to 15 minutes every 3 to 4 hours. Continue for the next 1 to 3 days or until your symptoms improve. Never put ice directly on your skin or ice an area longer than 15 minutes at a time.    Compression. Putting pressure on an injury helps reduce swelling and provides support. Wrap the injured area firmly with an elastic bandage/wrap. Make sure not to wrap the bandage too tightly or you will cut off blood flow to the injured area. If your bandage loosens, rewrap it.    Elevation. Keeping an injury raised above the level of your heart reduces swelling, pain, and throbbing. For instance, if you have a broken leg, it may help to rest your leg on several pillows when sitting or lying down.  Try to keep the injured area elevated for at least 2 to 3 hours per day.  Follow-up care  Follow up with your healthcare provider, or as advised.  When to seek medical advice  Call your healthcare provider right away if any of these occur:    Fever of 100.4 F (38 C) or higher, or as directed by your healthcare provider    Increased pain or swelling in the injured body part    Injured body part becomes cold, blue, numb, or tingly    Signs of infection. These include warmth in the skin, redness, drainage, or bad smell coming from the injured body part.  Date Last Reviewed: 1/18/2016 2000-2017 [employer]. [address] [place] [address]. All rights reserved. This information is not intended as a substitute for professional medical care. Always follow your healthcare professional's instructions.                Follow-ups after your visit        Your next 10 appointments already scheduled     Jul 26, 2017  3:00 PM CDT   New Visit with Israel Apodaca DPM   [facility] ([facility])    [address] [address] [phone]              Who to contact     If you have questions or need follow up information about today's clinic visit or your schedule please contact [facility] directly at [phone].  Normal or non-critical lab and imaging results will be communicated to you by [employer], letter or phone within 4 business days after the clinic has received the results. If you do not hear from us within 7 days, please contact the clinic through [employer] or phone. If you have a critical or abnormal lab result, we will notify you by phone as soon as possible.  Submit refill requests through in[employer] or call your pharmacy and they will forward the refill request to us. Please allow 3 business days for your refill to be completed.          Additional Information About Your Visit        inContact Information     inContact lets you send messages  "to your doctor, view your test results, renew your prescriptions, schedule appointments and more. To sign up, go to [email or web address] . Click on \"Log in\" on the left side of the screen, which will take you to the Welcome page. Then click on \"Sign up Now\" on the right side of the page.     You will be asked to enter the access code listed below, as well as some personal information. Please follow the directions to create your username and password.     Your access code is: [email or web address]  Expires: 10/23/2017  2:57 PM     Your access code will  in 90 days. If you need help or a new code, please call your [facility] clinic or [phone].        Care EveryWhere ID     This is your Care EveryWhere ID. This could be used by other organizations to access your [facility] medical records  [id number]        Your Vitals Were     Pulse Temperature Height Pulse Oximetry BMI (Body Mass Index)       64 98.7  F (37.1  C) (Tympanic) 6' 4\" (1.93 m) 95% 41.51 kg/m2        Blood Pressure from Last 3 Encounters:   17 112/72   17 126/82   16 128/77    Weight from Last 3 Encounters:   17 (!) 341 lb (154.7 kg)   17 (!) 338 lb (153.3 kg)   16 (!) 335 lb (152 kg)              Today, you had the following     No orders found for display       Primary Care Provider Office Phone # Fax #    Barrie Hope [phone]       [facility] [address] [facility] [address]        Equal Access to Services     SYED SHAIKH AH: Hadii audrey Wel[facility], wagildada lukelsieadaha, qaybta kaalmada valentina, meir emerson. So [facility] [phone].    ATENCIÓN: Si habla español, tiene a burton disposición servicios gratuitos de asistencia lingüística. Llame al [phone].    We comply with applicable federal civil rights laws and [place] laws. We do not discriminate on the basis of race, color, national origin, age, disability sex, sexual orientation or " gender identity.            Thank you!     Thank you for choosing [facility]  for your care. Our goal is always to provide you with excellent care. Hearing back from our patients is one way we can continue to improve our services. Please take a few minutes to complete the written survey that you may receive in the mail after your visit with us. Thank you!             Your Updated Medication List - Protect others around you: Learn how to safely use, store and throw away your medicines at www.disposemymeds.org.          This list is accurate as of: 7/25/17  2:57 PM.  Always use your most recent med list.                   Brand Name Dispense Instructions for use Diagnosis    allopurinol 100 MG tablet    ZYLOPRIM    90 tablet    Take 1 tablet (100 mg) by mouth daily    Acute gouty arthritis       ASPIRIN PO      Take 81 mg by mouth daily        * carvedilol 6.25 MG tablet    COREG    180 tablet    Take 1 tablet (6.25 mg) by mouth 2 times daily (with meals) Takes with the 12.5 tab    Essential hypertension, benign       * carvedilol 12.5 MG tablet    COREG    180 tablet    take 1 tablet 12.5 mg by mouth twice daily and take 1 tablet 6.25 mg twice daily    Essential hypertension, benign       CENTRUM SILVER per tablet      Take 1 tablet by mouth daily        chlorthalidone 25 MG tablet    HYGROTON    90 tablet    Take 1 tablet (25 mg) by mouth daily    Hypertension, goal below 140/90       lisinopril 20 MG tablet    PRINIVIL/ZESTRIL    180 tablet    Take 1 tablet (20 mg) by mouth 2 times daily    Hypertension, goal below 140/90       simvastatin 20 MG tablet    ZOCOR    90 tablet    Take 1 tablet (20 mg) by mouth At Bedtime    Hyperlipidemia LDL goal <160       * Notice:  This list has 2 medication(s) that are the same as other medications prescribed for you. Read the directions carefully, and ask your doctor or other care provider to review them with you.

## 2017-07-25 NOTE — NURSING NOTE
"Chief Complaint   Patient presents with     Knee Pain     right knee        Initial LMP 02/24/2017 (Exact Date) Estimated body mass index is 23.49 kg/(m^2) as calculated from the following:    Height as of 4/4/17: 5' 7\" (1.702 m).    Weight as of 4/4/17: 150 lb (68 kg).  Medication Reconciliation: complete     Sera Crisostomo CMA   "

## 2017-07-25 NOTE — LETTER
[facility]  [address] [address]  [phone]          07/25/ 2017        Stu Kee  [address] [address]  [phone] (home)           To whom it may concern:        Stu Kee was seen on July 25, 2017. Kindly excuse his absence on   absence on 7/28, 07/29 and 07/30/2017. He may can go back to work after that if he feels better.         Please contact me for questions or concerns.        Sincerely,        Orlando Robledo MD

## 2017-07-26 ENCOUNTER — DOCUMENTATION ONLY (OUTPATIENT)
Dept: FAMILY MEDICINE | Facility: CLINIC | Age: 62
End: 2017-07-26

## 2017-07-26 ENCOUNTER — OFFICE VISIT (OUTPATIENT)
Dept: PODIATRY | Facility: CLINIC | Age: 62
End: 2017-07-26
Payer: COMMERCIAL

## 2017-07-26 VITALS — WEIGHT: 315 LBS | HEIGHT: 76 IN | BODY MASS INDEX: 38.36 KG/M2

## 2017-07-26 DIAGNOSIS — L60.0 INGROWING NAIL: Primary | ICD-10-CM

## 2017-07-26 PROCEDURE — 99203 OFFICE O/P NEW LOW 30 MIN: CPT | Performed by: PODIATRIST

## 2017-07-26 NOTE — PATIENT INSTRUCTIONS
You have elected to proceed with Surgery for total surgical nail matrixectomy of bilateral great toes  Surgeries are performed on Tuesdays at [facility].   To schedule your surgery date please call [phone].  Please leave a message with a good time for our staff to call you back.    - Please have a date in mind for your surgery, you can feel free to leave that date on the message, and we will schedule and call back to confirm.     You can expect receive a call back the same day or on the next business day from Dr. Apodaca s team to assist in the scheduling.   - We will schedule the date of your surgery.  The time will be determined a few days ahead of time.  You can expect a call from Same Day Surgery 2-3 days ahead of time with specific instructions for what time to arrive at the hospital as well as any other preparations you should take prior to surgery.    - You may need to obtain a pre-operative physical from your primary medical provider. This must be done within 30 days of your surgery date.    - We will also schedule your first post-operative appointment for a bandage and wound check for the Monday following your surgery at the [facility].    - You may be non-weight bearing for a period of up to 6 weeks.  Options for this include: (Please indicate which you would prefer so we can provide you with an order and instructions)  o Crutches  o Walker  o Roll-a-bout knee walker.    - If you will need paperwork filled out for your employer you may drop those off at the clinic directly or you may have those faxed to us at [phone].  Please indicate on the form the date you would like the [place] to begin if it will not be your surgery date.    The forms are typically filled out for up to 12 weeks, however you may be cleared to return prior to that time depending on your individual healing and job requirements.

## 2017-07-26 NOTE — LETTER
SURGERYPLANNING/SCHEDULING WORKSHEET                              [facility]  [address]  [facility] [address]  [phone]                          Stu Kee                :  1955  MRN:  [id number]  Phone: [phone] (home)     Same Day Surgery   Surgeon: Israel Apodaca DPM  Diagnosis:   Ingrown toenail bilateral great toes  Allergies:  Amlodipine besylate and Lisinopril   A preoperative evaluation and physical will be done by this office.   ====================================================  Surgical Procedure:  Orthopedics:  Total surgical nail matrixectomy bilateral great toes  Length of Procedure:  30  Type of anesthesia:  Local  The proposed surgical procedure is considered INTERMEDIATE risk.  Date of Procedure:________________    Time: _____________________       Special Equipment: None  Informed Consent Obtained and Signed:  NO  ====================================================  Instructions to Same Day Surgery Staff  none  Preop Antibiotic:  Ancef 2 gm IV  pre-op within one hour prior to incision For > 80 kg  Preop Pain Meds:  None  Preop Orders:  Routine Standing Orders.  ====================================================  Instructions to the patient:  Preop physical exam scheduled (within 30 days or 7 days prior) with:   ____________________  Clinic:  ____________________                                         Date______________Time_________________________  Come to the hospital at: ________________________________  HOME PREPARATION:   Shower with Hibiclens the night before or the morning of surgery, gently cleaning skin from neck to feet  Bathe and brush teeth the morning of surgery.  Take medications with a sip of water the morning of surgery:   Check with  if taking insulin.  May have  a light meal, toast and clear liquids, up to 8 hrs before surgery  May have clear liquids (liquids one can read  through) up to 4 hrs before surgery  NOTHING after 4 hrs before surgery  Stop aspirin 7-10 days before surgery  Stop NSAIDS (Ibuproven, Naproxen, etc) 5 days before surgery  Stop Plavix 7-10 days before surgery      Israel Apodaca DPM    7/26/2017  This form was electronically signed at chart closure                                                                        Chart Copy

## 2017-07-26 NOTE — PROGRESS NOTES
X-ray findings discussed and suggested to continue conservative treatment for now. Follow-up if symptoms persist or worsen. All questions answered.    Results for orders placed or performed in visit on 07/25/17   XR Knee Standing Right 2 Views    Narrative    XR KNEE STANDING RT 2 VW 7/25/2017 2:47 PM    COMPARISON: None.    HISTORY: Right knee pain.      Impression    IMPRESSION: Mild to moderate tricompartmental right knee  osteoarthritis. Joint spaces appear largely preserved. No fractures  are seen.    MARYAM Robledo MD  [facility]

## 2017-07-26 NOTE — PROGRESS NOTES
Stu Kee is a 61 year old male who presents with a chief complain of a painful ingrown toenail to the right and left great toe.  The patient relates the ingrown nail was first noticed several weeks ago and has steadily become worse.  The patient relates the medial nail border is inflamed and sore to the touch.  The patient relates no bloody drainage.  The patient denies any redness extending up the big toe.  The patient has been treating the big toe by soaking it in salt water and antibiotics with no relief.       REVIEW OF SYSTEMS:  Constitutional, HEENT, cardiovascular, pulmonary, GI, , musculoskeletal, neuro, skin, endocrine and psych systems are negative, except as otherwise noted.     PAST MEDICAL HISTORY: No past medical history on file.     PAST SURGICAL HISTORY:   Past Surgical History:   Procedure Laterality Date     APPENDECTOMY       ARTHROSCOPY KNEE RT/LT  age 36    Torn Men Left     LASIK          MEDICATIONS:   Current Outpatient Prescriptions:      chlorthalidone (HYGROTON) 25 MG tablet, Take 1 tablet (25 mg) by mouth daily, Disp: 90 tablet, Rfl: 1     simvastatin (ZOCOR) 20 MG tablet, Take 1 tablet (20 mg) by mouth At Bedtime, Disp: 90 tablet, Rfl: 0     carvedilol (COREG) 12.5 MG tablet, take 1 tablet 12.5 mg by mouth twice daily and take 1 tablet 6.25 mg twice daily, Disp: 180 tablet, Rfl: 1     carvedilol (COREG) 6.25 MG tablet, Take 1 tablet (6.25 mg) by mouth 2 times daily (with meals) Takes with the 12.5 tab, Disp: 180 tablet, Rfl: 1     lisinopril (PRINIVIL/ZESTRIL) 20 MG tablet, Take 1 tablet (20 mg) by mouth 2 times daily, Disp: 180 tablet, Rfl: 1     allopurinol (ZYLOPRIM) 100 MG tablet, Take 1 tablet (100 mg) by mouth daily, Disp: 90 tablet, Rfl: 1     ASPIRIN PO, Take 81 mg by mouth daily , Disp: , Rfl:      Multiple Vitamins-Minerals (CENTRUM SILVER) per tablet, Take 1 tablet by mouth daily, Disp: , Rfl:      ALLERGIES:    Allergies   Allergen Reactions     Amlodipine  "Besylate Swelling     Lisinopril         SOCIAL HISTORY:   Social History     Social History     Marital status:      Spouse name: N/A     Number of children: N/A     Years of education: N/A     Occupational History     Not on file.     Social History Main Topics     Smoking status: Never Smoker     Smokeless tobacco: Never Used     Alcohol use Yes      Comment: 1 drink/month     Drug use: No     Sexual activity: Yes     Other Topics Concern     Parent/Sibling W/ Cabg, Mi Or Angioplasty Before 65f 55m? No     Social History Narrative        FAMILY HISTORY:   Family History   Problem Relation Age of Onset     HEART DISEASE Mother      HEART DISEASE Father      valve replaced     CANCER Father      CEREBROVASCULAR DISEASE Maternal Grandmother      Arrhythmia Maternal Grandmother      pacemaker     Arrhythmia Sister      pacemaker     DIABETES Sister      Hypertension Daughter      HEART DISEASE Other      Lung Cancer Sister         EXAM:Vitals: Ht 1.93 m (6' 4\")  Wt (!) 154.7 kg (341 lb)  BMI 41.51 kg/m2  BMI= Body mass index is 41.51 kg/(m^2).  Weight management plan: Patient was referred to their PCP to discuss a diet and exercise plan.    General appearance: Patient is alert and fully cooperative with history & exam.  No sign of distress is noted during the visit.     Psychiatric: Affect is pleasant & appropriate.  Patient appears motivated to improve health.     Respiratory: Breathing is regular & unlabored while sitting.     HEENT: Hearing is intact to spoken word.  Speech is clear.  No gross evidence of visual impairment that would impact ambulation.     Dermatologic: Skin is intact to both lower extremities without significant lesions, rash or abrasion.  Noted paronychia.     Vascular: DP & PT pulses are intact & regular bilaterally.  No significant edema or varicosities noted.  CFT and skin temperature is normal to both lower extremities.     Neurologic: Lower extremity sensation is intact to light " touch.  No evidence of weakness or contracture in the lower extremities.  No evidence of neuropathy.     Musculoskeletal: Patient is ambulatory without assistive device or brace.  No gross ankle deformity noted.  No foot or ankle joint effusion is noted.    One notes an inflamed medial nail border of the right and left great toe.  There is noted erythema and edema located around the medial labial fold and does not extend past the interphalangeal joint.  There is no apparent purulent drainage noted.  There is pain on palpation to the proximal aspect of the medial nail border.      Assessment:  1.  Paranychia to the medial aspect of the right and left great toe.      Plan:  I have explained to Stu about the condition.  The potential causes and nature of an ingrown toenail were discussed with the patient.  We reviewed the natural history/prognosis of the condition and potential risks if no treatment is provided.      Treatment options discussed included conservative management (oral antibiotics, soaking of foot, adequate width shoes)  as well as surgical management (partial or total nail removal).  The pros and cons of both forms of treatment were reviewed.      After thorough discussion and answering all questions, the patient elected to proceed with surgery.    At this point, I am recommending surgical treatment of the condition involving a total surgical nail matrixectomy of the great toe on the right and left foot.  I informed the patient in risks and benefits of the procedure including but not limited to infection, wound complications, swelling, pain, diminished range of motion and function, DVT and reoccurrence of condition.  The procedure will be performed under local  anesthesia.  The patient will obtain a preoperative history and physical by the primary care provider.  Consents will be reviewed and signed on the day of surgery.      Disclaimer: This note consists of symbols derived from keyboarding,  dictation and/or voice recognition software. As a result, there may be errors in the script that have gone undetected. Please consider this when interpreting information found in this chart.      MARIA Apodaca D.P.M., [email or web address].

## 2017-07-26 NOTE — LETTER
7/26/2017         RE: Stu Kee  [id number] [facility] [address]        Dear Colleague,    Thank you for referring your patient, Stu Kee, to the [facility]. Please see a copy of my visit note below.    Stu Kee is a 61 year old male who presents with a chief complain of a painful ingrown toenail to the right and left great toe.  The patient relates the ingrown nail was first noticed several weeks ago and has steadily become worse.  The patient relates the medial nail border is inflamed and sore to the touch.  The patient relates no bloody drainage.  The patient denies any redness extending up the big toe.  The patient has been treating the big toe by soaking it in salt water and antibiotics with no relief.       REVIEW OF SYSTEMS:  Constitutional, HEENT, cardiovascular, pulmonary, GI, , musculoskeletal, neuro, skin, endocrine and psych systems are negative, except as otherwise noted.     PAST MEDICAL HISTORY: No past medical history on file.     PAST SURGICAL HISTORY:   Past Surgical History:   Procedure Laterality Date     APPENDECTOMY       ARTHROSCOPY KNEE RT/LT  age 36    Torn Men Left     LASIK          MEDICATIONS:   Current Outpatient Prescriptions:      chlorthalidone (HYGROTON) 25 MG tablet, Take 1 tablet (25 mg) by mouth daily, Disp: 90 tablet, Rfl: 1     simvastatin (ZOCOR) 20 MG tablet, Take 1 tablet (20 mg) by mouth At Bedtime, Disp: 90 tablet, Rfl: 0     carvedilol (COREG) 12.5 MG tablet, take 1 tablet 12.5 mg by mouth twice daily and take 1 tablet 6.25 mg twice daily, Disp: 180 tablet, Rfl: 1     carvedilol (COREG) 6.25 MG tablet, Take 1 tablet (6.25 mg) by mouth 2 times daily (with meals) Takes with the 12.5 tab, Disp: 180 tablet, Rfl: 1     lisinopril (PRINIVIL/ZESTRIL) 20 MG tablet, Take 1 tablet (20 mg) by mouth 2 times daily, Disp: 180 tablet, Rfl: 1     allopurinol (ZYLOPRIM) 100 MG tablet, Take 1 tablet (100 mg) by mouth daily, Disp: 90  "tablet, Rfl: 1     ASPIRIN PO, Take 81 mg by mouth daily , Disp: , Rfl:      Multiple Vitamins-Minerals (CENTRUM SILVER) per tablet, Take 1 tablet by mouth daily, Disp: , Rfl:      ALLERGIES:    Allergies   Allergen Reactions     Amlodipine Besylate Swelling     Lisinopril         SOCIAL HISTORY:   Social History     Social History     Marital status:      Spouse name: N/A     Number of children: N/A     Years of education: N/A     Occupational History     Not on file.     Social History Main Topics     Smoking status: Never Smoker     Smokeless tobacco: Never Used     Alcohol use Yes      Comment: 1 drink/month     Drug use: No     Sexual activity: Yes     Other Topics Concern     Parent/Sibling W/ Cabg, Mi Or Angioplasty Before 65f 55m? No     Social History Narrative        FAMILY HISTORY:   Family History   Problem Relation Age of Onset     HEART DISEASE Mother      HEART DISEASE Father      valve replaced     CANCER Father      CEREBROVASCULAR DISEASE Maternal Grandmother      Arrhythmia Maternal Grandmother      pacemaker     Arrhythmia Sister      pacemaker     DIABETES Sister      Hypertension Daughter      HEART DISEASE Other      Lung Cancer Sister         EXAM:Vitals: Ht 1.93 m (6' 4\")  Wt (!) 154.7 kg (341 lb)  BMI 41.51 kg/m2  BMI= Body mass index is 41.51 kg/(m^2).  Weight management plan: Patient was referred to their PCP to discuss a diet and exercise plan.    General appearance: Patient is alert and fully cooperative with history & exam.  No sign of distress is noted during the visit.     Psychiatric: Affect is pleasant & appropriate.  Patient appears motivated to improve health.     Respiratory: Breathing is regular & unlabored while sitting.     HEENT: Hearing is intact to spoken word.  Speech is clear.  No gross evidence of visual impairment that would impact ambulation.     Dermatologic: Skin is intact to both lower extremities without significant lesions, rash or abrasion.  Noted " paronychia.     Vascular: DP & PT pulses are intact & regular bilaterally.  No significant edema or varicosities noted.  CFT and skin temperature is normal to both lower extremities.     Neurologic: Lower extremity sensation is intact to light touch.  No evidence of weakness or contracture in the lower extremities.  No evidence of neuropathy.     Musculoskeletal: Patient is ambulatory without assistive device or brace.  No gross ankle deformity noted.  No foot or ankle joint effusion is noted.    One notes an inflamed medial nail border of the right and left great toe.  There is noted erythema and edema located around the medial labial fold and does not extend past the interphalangeal joint.  There is no apparent purulent drainage noted.  There is pain on palpation to the proximal aspect of the medial nail border.      Assessment:  1.  Paranychia to the medial aspect of the right and left great toe.      Plan:  I have explained to Stu about the condition.  The potential causes and nature of an ingrown toenail were discussed with the patient.  We reviewed the natural history/prognosis of the condition and potential risks if no treatment is provided.      Treatment options discussed included conservative management (oral antibiotics, soaking of foot, adequate width shoes)  as well as surgical management (partial or total nail removal).  The pros and cons of both forms of treatment were reviewed.      After thorough discussion and answering all questions, the patient elected to proceed with surgery.    At this point, I am recommending surgical treatment of the condition involving a total surgical nail matrixectomy of the great toe on the right and left foot.  I informed the patient in risks and benefits of the procedure including but not limited to infection, wound complications, swelling, pain, diminished range of motion and function, DVT and reoccurrence of condition.  The procedure will be performed under local   anesthesia.  The patient will obtain a preoperative history and physical by the primary care provider.  Consents will be reviewed and signed on the day of surgery.      Disclaimer: This note consists of symbols derived from keyboarding, dictation and/or voice recognition software. As a result, there may be errors in the script that have gone undetected. Please consider this when interpreting information found in this chart.      MARIA Apodaca D.P.M., F.A.C.F.A.S.        Again, thank you for allowing me to participate in the care of your patient.        Sincerely,        Israel Apodaca DPM

## 2017-07-26 NOTE — MR AVS SNAPSHOT
After Visit Summary   7/26/2017    Stu Kee    MRN: [id number]           Patient Information     Date Of Birth          1955        Visit Information        Provider Department      7/26/2017 3:00 PM Israel Apodaca DPM [facility]        Today's Diagnoses     Ingrowing nail    -  1      Care Instructions    You have elected to proceed with Surgery for total surgical nail matrixectomy of bilateral great toes  Surgeries are performed on Tuesdays at [facility].   To schedule your surgery date please call [phone].  Please leave a message with a good time for our staff to call you back.    - Please have a date in mind for your surgery, you can feel free to leave that date on the message, and we will schedule and call back to confirm.     You can expect receive a call back the same day or on the next business day from Dr. Apodaca s team to assist in the scheduling.   - We will schedule the date of your surgery.  The time will be determined a few days ahead of time.  You can expect a call from Same Day Surgery 2-3 days ahead of time with specific instructions for what time to arrive at the hospital as well as any other preparations you should take prior to surgery.    - You may need to obtain a pre-operative physical from your primary medical provider. This must be done within 30 days of your surgery date.    - We will also schedule your first post-operative appointment for a bandage and wound check for the Monday following your surgery at the [place] location.    - You may be non-weight bearing for a period of up to 6 weeks.  Options for this include: (Please indicate which you would prefer so we can provide you with an order and instructions)  o Crutches  o Walker  o Roll-a-bout knee walker.    - If you will need paperwork filled out for your employer you may drop those off at the clinic directly or you may have those faxed to us at [phone].   "Please indicate on the form the date you would like the FMLA to begin if it will not be your surgery date.    The forms are typically filled out for up to 12 weeks, however you may be cleared to return prior to that time depending on your individual healing and job requirements.              Follow-ups after your visit        Who to contact     If you have questions or need follow up information about today's clinic visit or your schedule please contact [facility] directly at [phone].  Normal or non-critical lab and imaging results will be communicated to you by [employer], letter or phone within 4 business days after the clinic has received the results. If you do not hear from us within 7 days, please contact the clinic through [employer] or phone. If you have a critical or abnormal lab result, we will notify you by phone as soon as possible.  Submit refill requests through [employer] or call your pharmacy and they will forward the refill request to us. Please allow 3 business days for your refill to be completed.          Additional Information About Your Visit        [employer] Information     [employer] lets you send messages to your doctor, view your test results, renew your prescriptions, schedule appointments and more. To sign up, go to [email or web address] . Click on \"Log in\" on the left side of the screen, which will take you to the Welcome page. Then click on \"Sign up Now\" on the right side of the page.     You will be asked to enter the access code listed below, as well as some personal information. Please follow the directions to create your username and password.     Your access code is: [email or web address]  Expires: 10/23/2017  2:57 PM     Your access code will  in 90 days. If you need help or a new code, please call your [facility] or [phone].        Care EveryWhere ID     This is your Care EveryWhere ID. This could be used by other organizations to access your [facility] medical " "records  [id number]        Your Vitals Were     Height BMI (Body Mass Index)                1.93 m (6' 4\") 41.51 kg/m2           Blood Pressure from Last 3 Encounters:   07/25/17 112/72   04/12/17 126/82   12/20/16 128/77    Weight from Last 3 Encounters:   07/26/17 (!) 154.7 kg (341 lb)   07/25/17 (!) 154.7 kg (341 lb)   04/12/17 (!) 153.3 kg (338 lb)              Today, you had the following     No orders found for display       Primary Care Provider Office Phone # Fax #    Barrie Francis Hope [phone]       [facility] [address] [facility] [address]        Equal Access to Services     SYED SHAIKH : Hadii aad ku hadasho Soomaali, waaxda luqadaha, qaybta kaalmada adeegyada, meir robison . So [facility] [phone].    ATENCIÓN: Si habla español, tiene a burton disposición servicios gratuitos de asistencia lingüística. Llame al [phone].    We comply with applicable federal civil rights laws and [place] laws. We do not discriminate on the basis of race, color, national origin, age, disability sex, sexual orientation or gender identity.            Thank you!     Thank you for choosing [facility]  for your care. Our goal is always to provide you with excellent care. Hearing back from our patients is one way we can continue to improve our services. Please take a few minutes to complete the written survey that you may receive in the mail after your visit with us. Thank you!             Your Updated Medication List - Protect others around you: Learn how to safely use, store and throw away your medicines at www.disposemymeds.org.          This list is accurate as of: 7/26/17  3:25 PM.  Always use your most recent med list.                   Brand Name Dispense Instructions for use Diagnosis    allopurinol 100 MG tablet    ZYLOPRIM    90 tablet    Take 1 tablet (100 mg) by mouth daily    Acute gouty arthritis       ASPIRIN PO      Take 81 " mg by mouth daily        * carvedilol 6.25 MG tablet    COREG    180 tablet    Take 1 tablet (6.25 mg) by mouth 2 times daily (with meals) Takes with the 12.5 tab    Essential hypertension, benign       * carvedilol 12.5 MG tablet    COREG    180 tablet    take 1 tablet 12.5 mg by mouth twice daily and take 1 tablet 6.25 mg twice daily    Essential hypertension, benign       CENTRUM SILVER per tablet      Take 1 tablet by mouth daily        chlorthalidone 25 MG tablet    HYGROTON    90 tablet    Take 1 tablet (25 mg) by mouth daily    Hypertension, goal below 140/90       lisinopril 20 MG tablet    PRINIVIL/ZESTRIL    180 tablet    Take 1 tablet (20 mg) by mouth 2 times daily    Hypertension, goal below 140/90       simvastatin 20 MG tablet    ZOCOR    90 tablet    Take 1 tablet (20 mg) by mouth At Bedtime    Hyperlipidemia LDL goal <160       * Notice:  This list has 2 medication(s) that are the same as other medications prescribed for you. Read the directions carefully, and ask your doctor or other care provider to review them with you.

## 2017-10-11 ENCOUNTER — ALLIED HEALTH/NURSE VISIT (OUTPATIENT)
Dept: FAMILY MEDICINE | Facility: CLINIC | Age: 62
End: 2017-10-11
Payer: COMMERCIAL

## 2017-10-11 DIAGNOSIS — Z53.9 DIAGNOSIS NOT YET DEFINED: Primary | ICD-10-CM

## 2017-10-11 PROCEDURE — 99207 ZZC NO CHARGE NURSE ONLY: CPT

## 2017-10-11 NOTE — NURSING NOTE
Stu and his wife walked in clinic at 7am wanting to get an appointment after having the ambulance come to their house this am for an artery that will not stop bleeding. Ambulance advised them they needed to go to ER but said they could drive themselves since they did not want ambulance transport. Thy did not want to go to [facility] and were hoping to be cared for at [place] or [facility]. I informed them that the best action of care was to go to the ER and that we do not have the resources in clinic they need to care for him. The wife stated they will go to [facility] due to not wanting to go to [facility] and Stu stated they would go to ER. They were upset and stated thanks for but no thanks for your help and left. Beau and myself were the only ones in clinic at this time and we agreed that advising him he needed ER attention was the best care for this patient. Simi Winter MA

## 2017-10-11 NOTE — MR AVS SNAPSHOT
"              After Visit Summary   10/11/2017    Stu Kee    MRN: [id number]           Patient Information     Date Of Birth          1955        Visit Information        Provider Department      10/11/2017 8:45 AM [place] RC CMA/LPN [facility]        Today's Diagnoses     DIAGNOSIS NOT YET DEFINED    -  1       Follow-ups after your visit        Your next 10 appointments already scheduled     Oct 11, 2017  8:45 AM CDT   Nurse Only with [place] RC CMA/LPN   [facility] ([facility])    [address] [place] [id number] [phone]              Who to contact     If you have questions or need follow up information about today's clinic visit or your schedule please contact [facility] directly at [phone].  Normal or non-critical lab and imaging results will be communicated to you by MyChart, letter or phone within 4 business days after the clinic has received the results. If you do not hear from us within 7 days, please contact the clinic through MyChart or phone. If you have a critical or abnormal lab result, we will notify you by phone as soon as possible.  Submit refill requests through [email or web address] or call your pharmacy and they will forward the refill request to us. Please allow 3 business days for your refill to be completed.          Additional Information About Your Visit        MyChart Information     [email or web address] lets you send messages to your doctor, view your test results, renew your prescriptions, schedule appointments and more. To sign up, go to [email or web address] . Click on \"Log in\" on the left side of the screen, which will take you to the Welcome page. Then click on \"Sign up Now\" on the right side of the page.     You will be asked to enter the access code listed below, as well as some personal information. Please follow the directions to create your username and password.     Your access code is: [email or web address]  Expires: " 10/23/2017  2:57 PM     Your access code will  in 90 days. If you need help or a new code, please call your [place] clinic or [phone].        Care EveryWhere ID     This is your Care EveryWhere ID. This could be used by other organizations to access your [place] medical records  [id number]         Blood Pressure from Last 3 Encounters:   17 112/72   17 126/82   16 128/77    Weight from Last 3 Encounters:   17 (!) 341 lb (154.7 kg)   17 (!) 341 lb (154.7 kg)   17 (!) 338 lb (153.3 kg)              Today, you had the following     No orders found for display       Primary Care Provider Office Phone # Fax #    Barrie Hope [phone] [address] [facility] [address]        Equal Access to Services     SYED SHAIKH : Hadii aad ku hadasho Soomaali, waaxda luqadaha, qaybta kaalmada adeegyada, waxay idiin hayflorencen elijah kharabucky robison . So [facility] [phone].    ATENCIÓN: Si habla español, tiene a burton disposición servicios gratuitos de asistencia lingüística. Llame al [phone].    We comply with applicable federal civil rights laws and [place] laws. We do not discriminate on the basis of race, color, national origin, age, disability, sex, sexual orientation, or gender identity.            Thank you!     Thank you for choosing [facility]  for your care. Our goal is always to provide you with excellent care. Hearing back from our patients is one way we can continue to improve our services. Please take a few minutes to complete the written survey that you may receive in the mail after your visit with us. Thank you!             Your Updated Medication List - Protect others around you: Learn how to safely use, store and throw away your medicines at www.disposemymeds.org.          This list is accurate as of: 10/11/17  7:19 AM.  Always use your most recent med list.                   Brand Name Dispense Instructions for  use Diagnosis    allopurinol 100 MG tablet    ZYLOPRIM    90 tablet    Take 1 tablet (100 mg) by mouth daily    Acute gouty arthritis       ASPIRIN PO      Take 81 mg by mouth daily        * carvedilol 6.25 MG tablet    COREG    180 tablet    Take 1 tablet (6.25 mg) by mouth 2 times daily (with meals) Takes with the 12.5 tab    Essential hypertension, benign       * carvedilol 12.5 MG tablet    COREG    180 tablet    take 1 tablet 12.5 mg by mouth twice daily and take 1 tablet 6.25 mg twice daily    Essential hypertension, benign       CENTRUM SILVER per tablet      Take 1 tablet by mouth daily        chlorthalidone 25 MG tablet    HYGROTON    90 tablet    Take 1 tablet (25 mg) by mouth daily    Hypertension, goal below 140/90       lisinopril 20 MG tablet    PRINIVIL/ZESTRIL    180 tablet    Take 1 tablet (20 mg) by mouth 2 times daily    Hypertension, goal below 140/90       simvastatin 20 MG tablet    ZOCOR    90 tablet    Take 1 tablet (20 mg) by mouth At Bedtime    Hyperlipidemia LDL goal <160       * Notice:  This list has 2 medication(s) that are the same as other medications prescribed for you. Read the directions carefully, and ask your doctor or other care provider to review them with you.

## 2017-10-23 ENCOUNTER — TELEPHONE (OUTPATIENT)
Dept: OTHER | Facility: CLINIC | Age: 62
End: 2017-10-23

## 2017-10-23 ENCOUNTER — OFFICE VISIT (OUTPATIENT)
Dept: FAMILY MEDICINE | Facility: CLINIC | Age: 62
End: 2017-10-23
Payer: COMMERCIAL

## 2017-10-23 VITALS
HEART RATE: 64 BPM | WEIGHT: 315 LBS | RESPIRATION RATE: 18 BRPM | BODY MASS INDEX: 38.36 KG/M2 | DIASTOLIC BLOOD PRESSURE: 82 MMHG | TEMPERATURE: 98 F | HEIGHT: 76 IN | SYSTOLIC BLOOD PRESSURE: 118 MMHG

## 2017-10-23 DIAGNOSIS — Z48.02 VISIT FOR SUTURE REMOVAL: ICD-10-CM

## 2017-10-23 DIAGNOSIS — Z95.2 AORTIC VALVE REPLACED: ICD-10-CM

## 2017-10-23 DIAGNOSIS — I10 HYPERTENSION, GOAL BELOW 140/90: ICD-10-CM

## 2017-10-23 DIAGNOSIS — M10.079 IDIOPATHIC GOUT INVOLVING TOE, UNSPECIFIED CHRONICITY, UNSPECIFIED LATERALITY: ICD-10-CM

## 2017-10-23 DIAGNOSIS — Z23 NEED FOR PROPHYLACTIC VACCINATION AND INOCULATION AGAINST INFLUENZA: ICD-10-CM

## 2017-10-23 DIAGNOSIS — Z71.89 ADVANCED DIRECTIVES, COUNSELING/DISCUSSION: ICD-10-CM

## 2017-10-23 DIAGNOSIS — I83.93 VARICOSE VEINS OF BOTH LOWER EXTREMITIES: Primary | ICD-10-CM

## 2017-10-23 PROCEDURE — 99214 OFFICE O/P EST MOD 30 MIN: CPT | Mod: 25 | Performed by: FAMILY MEDICINE

## 2017-10-23 PROCEDURE — 90686 IIV4 VACC NO PRSV 0.5 ML IM: CPT | Performed by: FAMILY MEDICINE

## 2017-10-23 PROCEDURE — 90471 IMMUNIZATION ADMIN: CPT | Performed by: FAMILY MEDICINE

## 2017-10-23 RX ORDER — ALLOPURINOL 100 MG/1
100 TABLET ORAL DAILY
Qty: 90 TABLET | Refills: 1 | Status: SHIPPED | OUTPATIENT
Start: 2017-10-23

## 2017-10-23 NOTE — MR AVS SNAPSHOT
After Visit Summary   10/23/2017    Stu Kee    MRN: [id number]           Patient Information     Date Of Birth          1955        Visit Information        Provider Department      10/23/2017 8:20 AM Orlando Robledo MD [facility]        Today's Diagnoses     Varicose veins of both lower extremities    -  1    Aortic valve replaced        Hypertension, goal below 140/90        Visit for suture removal        Advanced directives, counseling/discussion        Idiopathic gout involving toe, unspecified chronicity, unspecified laterality          Care Instructions      Varicose Veins     Varicose veins are swollen, enlarged veins most often found in the legs. They are usually blue or purple in color and may bulge, twist, and stand out under the skin.  Normally, veins return blood from the body to the heart. The leg veins have one-way valves that prevent blood from flowing backward in the vein. When the valves are weak or damaged, blood backs up in the veins. This may cause some of the veins to swell and bulge and become varicose veins.  Symptoms  Varicose veins may or may not cause symptoms. If symptoms do occur, they can include:    Legs that feel tired, achy, heavy, or itchy    Leg muscle cramps    Skin changes, such as discoloration, dryness, redness, or rash (in more severe cases, you may also have sores on the skin called venous leg ulcers)  Risk Factors  There are a number of factors that increase the risk for varicose veins. These can include:    Being a woman    Being older    Sitting or standing for long periods    Being overweight    Being pregnant    Having a family history of varicose veins  Treatment begins with simple self-help measures (see below). If these don t help, there are many procedures that can be done to shrink or remove varicose veins. Your healthcare provider can tell you more about these options, if needed.  Home care    Support or compression  stockings will likely be prescribed. If so, be sure to wear them as directed. They may help improve blood flow.    Exercising helps strengthen your leg muscles and improve blood flow. To get the most benefit, choose exercises such as walking, swimming, or cycling. Also try to exercise for at least 30 minutes on most days.    Raising (elevating) your legs lets gravity help blood flow back to the heart. Sit or lie with your feet above heart level a few times throughout the day, or as directed.    Avoid long periods of sitting or standing. Change positions often. Also, move your ankles, toes and knees often. This may also help improve blood flow.    If you are overweight, talk with your healthcare provider about setting up a weight-loss plan. Maintaining a healthy weight can help reduce the strain on your veins. It may also improve symptoms, such as swelling and aching.    If you have dryness and itching, ask your provider about special lotions that can be applied to the skin to help improve symptoms.  Follow-up care  Follow up with your healthcare provider, or as directed. If imaging tests were done, you ll be told the results and if there are any new findings that affect your care.  When to seek medical advice  Call your healthcare provider right away if any of these occur:    Sudden, severe leg swelling, pain, or redness    Symptoms worsen, or they don t improve with self-care    Bleeding from any affected veins    Ulcers form on the legs, ankles, or feet    Fever of 100.4 F (38 C) or higher, or as advised by your provider  Date Last Reviewed: 9/21/2015 2000-2017 [employer]. [address] [place] [address], [place] [address]. All rights reserved. This information is not intended as a substitute for professional medical care. Always follow your healthcare professional's instructions.        Gout Diet  Gout is a painful condition caused by an excess of uric acid, a waste product made by the body. Uric acid  forms crystals that collect in the joints. The immune response to these crystals brings on symptoms of joint pain and swelling. This is called a gout attack. Often, medications and diet changes are combined to manage gout. Below are some guidelines for changing your diet to help you manage gout and prevent attacks. Your health care provider will help you determine the best eating plan for you.     Eating to manage gout  Weight loss for those who are overweight may help reduce gout attacks.  Eat less of these foods  Eating too many foods containing purines may raise the levels of uric acid in your body. This raises your risk for a gout attack. Try to limit these foods and drinks:    Alcohol, such as beer and red wine. You may be told to avoid alcohol completely.    Soft drinks that contain sugar or high fructose corn syrup    Certain fish, including anchovies, sardines, fish eggs, and herring    Shellfish    Certain meats, such as red meat, hot dogs, luncheon meats, and turkey    Organ meats, such as liver, kidneys, and sweetbreads    Legumes, such as dried beans and peas    Other high fat foods such as gravy, whole milk, and high fat cheeses    Vegetables such as asparagus, cauliflower, spinach, and mushrooms used to be thought to contribute to an increased risk for a gout attack, but recent studies show that high purine vegetables don't increase the risk for a gout attack.  Eat more of these foods  Other foods may be helpful for people with gout. Add some of these foods to your diet:    Cherries contain chemicals that may lower uric acid.    Omega fatty acids. These are found in some fatty fish such as salmon, certain oils (flax, olive, or nut), and nuts themselves. Omega fatty acids may help prevent inflammation due to gout.    Dairy products that are low-fat or fat-free, such as cheese and yogurt    Complex carbohydrate foods, including whole grains, brown rice, oats, and beans    Coffee, in moderation    Water,  approximately 64 ounces per day  Follow-up care  Follow up with your healthcare provider as advised.  When to seek medical advice  Call your healthcare provider right away if any of these occur:    Return of gout symptoms, usually at night:    Severe pain, swelling, and heat in a joint, especially the base of the big toe    Affected joint is hard to move    Skin of the affected joint is purple or red    Fever of 100.4 F (38 C) or higher    Pain that doesn't get better even with prescribed medicine   Date Last Reviewed: 1/12/2016 2000-2017 [employer]. [address] [place] [address]. All rights reserved. This information is not intended as a substitute for professional medical care. Always follow your healthcare professional's instructions.                Follow-ups after your visit        Additional Services     VASCULAR SURGERY REFERRAL       Your provider has referred you to: [facility]: [facility] [phone] (Varicose Veins)   [email or web address] [place] [email or web address]    Please be aware that coverage of these services is subject to the terms and limitations of your health insurance plan.  Call member services at your health plan with any benefit or coverage questions.      Please bring the following with you to your appointment:    (1) Any X-Rays, CTs or MRIs which have been performed.  Contact the facility where they were done to arrange for  prior to your scheduled appointment.    (2) List of current medications   (3) This referral request   (4) Any documents/labs given to you for this referral                  Who to contact     If you have questions or need follow up information about today's clinic visit or your schedule please contact [facility] directly at [phone].  Normal or non-critical lab and imaging results will be communicated to you by MyChart, letter or phone within 4 business days after the clinic has received the results.  "If you do not hear from us within 7 days, please contact the clinic through [employer] or phone. If you have a critical or abnormal lab result, we will notify you by phone as soon as possible.  Submit refill requests through [employer] or call your pharmacy and they will forward the refill request to us. Please allow 3 business days for your refill to be completed.          Additional Information About Your Visit        [employer] Information     [employer]ight lets you send messages to your doctor, view your test results, renew your prescriptions, schedule appointments and more. To sign up, go to [email or web address] . Click on \"Log in\" on the left side of the screen, which will take you to the Welcome page. Then click on \"Sign up Now\" on the right side of the page.     You will be asked to enter the access code listed below, as well as some personal information. Please follow the directions to create your username and password.     Your access code is: [email or web address]  Expires: 10/23/2017  2:57 PM     Your access code will  in 90 days. If you need help or a new code, please call your [place] clinic or [phone].        Care EveryWhere ID     This is your Care EveryWhere ID. This could be used by other organizations to access your [place] medical records  [id number]        Your Vitals Were     Pulse Temperature Respirations Height BMI (Body Mass Index)       64 98  F (36.7  C) (Tympanic) 18 6' 4\" (1.93 m) 41.51 kg/m2        Blood Pressure from Last 3 Encounters:   10/23/17 118/82   17 112/72   17 126/82    Weight from Last 3 Encounters:   10/23/17 (!) 341 lb (154.7 kg)   17 (!) 341 lb (154.7 kg)   17 (!) 341 lb (154.7 kg)              We Performed the Following     VASCULAR SURGERY REFERRAL        Primary Care Provider Office Phone # Fax #    Barrie Hope [phone] [address] [facility] [address]        Equal Access to Services     SYED SHAIKH AH: Hadii " audrey Welch, waaxda luqadaha, qaybta kaalmada valentina, meir mario alberto archanaatif campbellwendy cmsebastian laCarmenvini ki. So [facility] [phone].    ATENCIÓN: Si habla madan, tiene a burton disposición servicios gratuitos de asistencia lingüística. Abundio al [phone].    We comply with applicable federal civil rights laws and [place] laws. We do not discriminate on the basis of race, color, national origin, age, disability, sex, sexual orientation, or gender identity.            Thank you!     Thank you for choosing [facility]  for your care. Our goal is always to provide you with excellent care. Hearing back from our patients is one way we can continue to improve our services. Please take a few minutes to complete the written survey that you may receive in the mail after your visit with us. Thank you!             Your Updated Medication List - Protect others around you: Learn how to safely use, store and throw away your medicines at www.disposemymeds.org.          This list is accurate as of: 10/23/17  8:54 AM.  Always use your most recent med list.                   Brand Name Dispense Instructions for use Diagnosis    allopurinol 100 MG tablet    ZYLOPRIM    90 tablet    Take 1 tablet (100 mg) by mouth daily    Acute gouty arthritis       ASPIRIN PO      Take 81 mg by mouth daily        * carvedilol 6.25 MG tablet    COREG    180 tablet    Take 1 tablet (6.25 mg) by mouth 2 times daily (with meals) Takes with the 12.5 tab    Essential hypertension, benign       * carvedilol 12.5 MG tablet    COREG    180 tablet    take 1 tablet 12.5 mg by mouth twice daily and take 1 tablet 6.25 mg twice daily    Essential hypertension, benign       CENTRUM SILVER per tablet      Take 1 tablet by mouth daily        chlorthalidone 25 MG tablet    HYGROTON    90 tablet    Take 1 tablet (25 mg) by mouth daily    Hypertension, goal below 140/90       lisinopril 20 MG tablet    PRINIVIL/ZESTRIL    180 tablet    Take 1 tablet  (20 mg) by mouth 2 times daily    Hypertension, goal below 140/90       simvastatin 20 MG tablet    ZOCOR    90 tablet    Take 1 tablet (20 mg) by mouth At Bedtime    Hyperlipidemia LDL goal <160       * Notice:  This list has 2 medication(s) that are the same as other medications prescribed for you. Read the directions carefully, and ask your doctor or other care provider to review them with you.

## 2017-10-23 NOTE — NURSING NOTE
"Chief Complaint   Patient presents with     ER F/U       Initial /82 (Cuff Size: Adult Large)  Pulse 64  Temp 98  F (36.7  C) (Tympanic)  Resp 18  Ht 6' 4\" (1.93 m)  Wt (!) 341 lb (154.7 kg)  BMI 41.51 kg/m2 Estimated body mass index is 41.51 kg/(m^2) as calculated from the following:    Height as of this encounter: 6' 4\" (1.93 m).    Weight as of this encounter: 341 lb (154.7 kg).  Medication Reconciliation: complete    Health Maintenance that is potentially due pending provider review:  NONE    n/a    Is there anyone who you would like to be able to receive your results? Not Applicable  If yes have patient fill out LISE    "

## 2017-10-23 NOTE — PROGRESS NOTES

## 2017-10-23 NOTE — PROGRESS NOTES
SUBJECTIVE:   Stu Kee is a 61 year old male who presents to clinic today for the following health issues:      ED/UC Followup:    Facility:  [facility]   Date of visit: 10/11/2017  Reason for visit: Had a blood vessel burst on side of right leg   Current Status: has 3 stitches in it. No bleeding since then.   Known to have varicose vein, was rubbing his legs when one vein start bleeding, went to ER  3 stitcheswere placed, denies any other systemic symptoms          Problem list and histories reviewed & adjusted, as indicated.  Additional history: as documented    Patient Active Problem List   Diagnosis     CARDIOVASCULAR SCREENING; LDL GOAL LESS THAN 160     Advanced directives, counseling/discussion     Hypertension, goal below 140/90     Aortic valve replaced     Abscess of leg, left s/p I+D      Traumatic open wound of lower leg with infection, left, subsequent encounter     Acute idiopathic gout of left foot     DIAGNOSIS NOT YET DEFINED     Past Surgical History:   Procedure Laterality Date     APPENDECTOMY       ARTHROSCOPY KNEE RT/LT  age 36    Torn Men Left     LASIK         Social History   Substance Use Topics     Smoking status: Never Smoker     Smokeless tobacco: Never Used     Alcohol use Yes      Comment: 1 drink/month     Family History   Problem Relation Age of Onset     HEART DISEASE Mother      HEART DISEASE Father      valve replaced     CANCER Father      CEREBROVASCULAR DISEASE Maternal Grandmother      Arrhythmia Maternal Grandmother      pacemaker     Arrhythmia Sister      pacemaker     DIABETES Sister      Hypertension Daughter      HEART DISEASE Other      Lung Cancer Sister          Current Outpatient Prescriptions   Medication Sig Dispense Refill     chlorthalidone (HYGROTON) 25 MG tablet Take 1 tablet (25 mg) by mouth daily 90 tablet 1     simvastatin (ZOCOR) 20 MG tablet Take 1 tablet (20 mg) by mouth At Bedtime 90 tablet 0     carvedilol (COREG) 12.5 MG tablet take 1  "tablet 12.5 mg by mouth twice daily and take 1 tablet 6.25 mg twice daily 180 tablet 1     carvedilol (COREG) 6.25 MG tablet Take 1 tablet (6.25 mg) by mouth 2 times daily (with meals) Takes with the 12.5 tab 180 tablet 1     lisinopril (PRINIVIL/ZESTRIL) 20 MG tablet Take 1 tablet (20 mg) by mouth 2 times daily 180 tablet 1     ASPIRIN PO Take 81 mg by mouth daily        Multiple Vitamins-Minerals (CENTRUM SILVER) per tablet Take 1 tablet by mouth daily       allopurinol (ZYLOPRIM) 100 MG tablet Take 1 tablet (100 mg) by mouth daily (Patient not taking: Reported on 10/23/2017) 90 tablet 1     Allergies   Allergen Reactions     Amlodipine Besylate Swelling     Lisinopril      Recent Labs   Lab Test  09/16/16   1038  09/12/16   0915   07/23/16 2125   ALT   --    --    --   42   CR  1.11  1.11   < >  1.11   GFRESTIMATED  67  67   < >  67   GFRESTBLACK  81  81   < >  81   POTASSIUM  3.6  3.2*   < >  3.1*    < > = values in this interval not displayed.      BP Readings from Last 3 Encounters:   10/23/17 118/82   07/25/17 112/72   04/12/17 126/82    Wt Readings from Last 3 Encounters:   10/23/17 (!) 341 lb (154.7 kg)   07/26/17 (!) 341 lb (154.7 kg)   07/25/17 (!) 341 lb (154.7 kg)                  Labs reviewed in EPIC          Reviewed and updated as needed this visit by clinical staff       Reviewed and updated as needed this visit by Provider         ROS:  Constitutional, HEENT, cardiovascular, pulmonary, gi and gu systems are negative, except as otherwise noted.      OBJECTIVE:   /82 (Cuff Size: Adult Large)  Pulse 64  Temp 98  F (36.7  C) (Tympanic)  Resp 18  Ht 6' 4\" (1.93 m)  Wt (!) 341 lb (154.7 kg)  BMI 41.51 kg/m2  Body mass index is 41.51 kg/(m^2).  GENERAL: alert, no distress and obese  NECK: no adenopathy, no asymmetry, masses, or scars and thyroid normal to palpation  RESP: lungs clear to auscultation - no rales, rhonchi or wheezes  CV: regular rates and rhythm, normal S1 S2, no S3 or S4 and " no murmur, click or rub  ABDOMEN: soft, nontender, no hepatosplenomegaly, no masses and bowel sounds normal  MS: no gross musculoskeletal defects noted, no edema  SKIN: 3 x stitches in-situ right lower leg, C/D/I. varicose veins bilaterally, sensation to touch and pressure intact, 1+ pedal edema bilaterally, pedal pulses 2+, onychomycosis  NEURO: Normal strength and tone, mentation intact and speech normal  LYMPH: no cervical, supraclavicular, axillary, or inguinal adenopathy      ASSESSMENT/PLAN:     1. Varicose veins of both lower extremities  - Home care discussed in detail, written information provided  - VASCULAR SURGERY REFERRAL      2. Aortic valve replaced  - History of aortic valve replacement  - Continue aspirin      3. Hypertension, goal below 140/90  - Blood pressure well controlled, continue chlorthalidone, carvedilol and lisinopril      4. Visit for suture removal  - Stitches removed in office today without any difficulty, Band-Aid applied      5. Advanced directives, counseling/discussion  - Advance care directive counseling provided, forms given and suggested to leave a copy was completed      6. Idiopathic gout involving toe, unspecified chronicity, unspecified laterality  - Patient stopped taking allopurinol, uric acid were high back in December 2016  - Suggested to restart allopurinol and take aleeve as well for 2-3 weeks  - Will repeat uric acid levels in 3-6 months      Influenza vaccination administered in office today. Follow up in 3-6 months or earlier if needed. All questions answered.       MEDICATIONS:   Orders Placed This Encounter   Medications     allopurinol (ZYLOPRIM) 100 MG tablet     Sig: Take 1 tablet (100 mg) by mouth daily     Dispense:  90 tablet     Refill:  1     Patient Instructions     Varicose Veins     Varicose veins are swollen, enlarged veins most often found in the legs. They are usually blue or purple in color and may bulge, twist, and stand out under the  skin.  Normally, veins return blood from the body to the heart. The leg veins have one-way valves that prevent blood from flowing backward in the vein. When the valves are weak or damaged, blood backs up in the veins. This may cause some of the veins to swell and bulge and become varicose veins.  Symptoms  Varicose veins may or may not cause symptoms. If symptoms do occur, they can include:    Legs that feel tired, achy, heavy, or itchy    Leg muscle cramps    Skin changes, such as discoloration, dryness, redness, or rash (in more severe cases, you may also have sores on the skin called venous leg ulcers)  Risk Factors  There are a number of factors that increase the risk for varicose veins. These can include:    Being a woman    Being older    Sitting or standing for long periods    Being overweight    Being pregnant    Having a family history of varicose veins  Treatment begins with simple self-help measures (see below). If these don t help, there are many procedures that can be done to shrink or remove varicose veins. Your healthcare provider can tell you more about these options, if needed.  Home care    Support or compression stockings will likely be prescribed. If so, be sure to wear them as directed. They may help improve blood flow.    Exercising helps strengthen your leg muscles and improve blood flow. To get the most benefit, choose exercises such as walking, swimming, or cycling. Also try to exercise for at least 30 minutes on most days.    Raising (elevating) your legs lets gravity help blood flow back to the heart. Sit or lie with your feet above heart level a few times throughout the day, or as directed.    Avoid long periods of sitting or standing. Change positions often. Also, move your ankles, toes and knees often. This may also help improve blood flow.    If you are overweight, talk with your healthcare provider about setting up a weight-loss plan. Maintaining a healthy weight can help reduce the  strain on your veins. It may also improve symptoms, such as swelling and aching.    If you have dryness and itching, ask your provider about special lotions that can be applied to the skin to help improve symptoms.  Follow-up care  Follow up with your healthcare provider, or as directed. If imaging tests were done, you ll be told the results and if there are any new findings that affect your care.  When to seek medical advice  Call your healthcare provider right away if any of these occur:    Sudden, severe leg swelling, pain, or redness    Symptoms worsen, or they don t improve with self-care    Bleeding from any affected veins    Ulcers form on the legs, ankles, or feet    Fever of 100.4 F (38 C) or higher, or as advised by your provider  Date Last Reviewed: 9/21/2015 2000-2017 [employer]. [address] [place] [address]. All rights reserved. This information is not intended as a substitute for professional medical care. Always follow your healthcare professional's instructions.        Gout Diet  Gout is a painful condition caused by an excess of uric acid, a waste product made by the body. Uric acid forms crystals that collect in the joints. The immune response to these crystals brings on symptoms of joint pain and swelling. This is called a gout attack. Often, medications and diet changes are combined to manage gout. Below are some guidelines for changing your diet to help you manage gout and prevent attacks. Your health care provider will help you determine the best eating plan for you.     Eating to manage gout  Weight loss for those who are overweight may help reduce gout attacks.  Eat less of these foods  Eating too many foods containing purines may raise the levels of uric acid in your body. This raises your risk for a gout attack. Try to limit these foods and drinks:    Alcohol, such as beer and red wine. You may be told to avoid alcohol completely.    Soft drinks that contain  sugar or high fructose corn syrup    Certain fish, including anchovies, sardines, fish eggs, and herring    Shellfish    Certain meats, such as red meat, hot dogs, luncheon meats, and turkey    Organ meats, such as liver, kidneys, and sweetbreads    Legumes, such as dried beans and peas    Other high fat foods such as gravy, whole milk, and high fat cheeses    Vegetables such as asparagus, cauliflower, spinach, and mushrooms used to be thought to contribute to an increased risk for a gout attack, but recent studies show that high purine vegetables don't increase the risk for a gout attack.  Eat more of these foods  Other foods may be helpful for people with gout. Add some of these foods to your diet:    Cherries contain chemicals that may lower uric acid.    Omega fatty acids. These are found in some fatty fish such as salmon, certain oils (flax, olive, or nut), and nuts themselves. Omega fatty acids may help prevent inflammation due to gout.    Dairy products that are low-fat or fat-free, such as cheese and yogurt    Complex carbohydrate foods, including whole grains, brown rice, oats, and beans    Coffee, in moderation    Water, approximately 64 ounces per day  Follow-up care  Follow up with your healthcare provider as advised.  When to seek medical advice  Call your healthcare provider right away if any of these occur:    Return of gout symptoms, usually at night:    Severe pain, swelling, and heat in a joint, especially the base of the big toe    Affected joint is hard to move    Skin of the affected joint is purple or red    Fever of 100.4 F (38 C) or higher    Pain that doesn't get better even with prescribed medicine   Date Last Reviewed: 1/12/2016 2000-2017 [employer]. [address] [place] [address], [place] [address]. All rights reserved. This information is not intended as a substitute for professional medical care. Always follow your healthcare professional's instructions.            Orlando DOMINGUEZ  MD Venkat  [facility]

## 2017-10-23 NOTE — PATIENT INSTRUCTIONS
Varicose Veins     Varicose veins are swollen, enlarged veins most often found in the legs. They are usually blue or purple in color and may bulge, twist, and stand out under the skin.  Normally, veins return blood from the body to the heart. The leg veins have one-way valves that prevent blood from flowing backward in the vein. When the valves are weak or damaged, blood backs up in the veins. This may cause some of the veins to swell and bulge and become varicose veins.  Symptoms  Varicose veins may or may not cause symptoms. If symptoms do occur, they can include:    Legs that feel tired, achy, heavy, or itchy    Leg muscle cramps    Skin changes, such as discoloration, dryness, redness, or rash (in more severe cases, you may also have sores on the skin called venous leg ulcers)  Risk Factors  There are a number of factors that increase the risk for varicose veins. These can include:    Being a woman    Being older    Sitting or standing for long periods    Being overweight    Being pregnant    Having a family history of varicose veins  Treatment begins with simple self-help measures (see below). If these don t help, there are many procedures that can be done to shrink or remove varicose veins. Your healthcare provider can tell you more about these options, if needed.  Home care    Support or compression stockings will likely be prescribed. If so, be sure to wear them as directed. They may help improve blood flow.    Exercising helps strengthen your leg muscles and improve blood flow. To get the most benefit, choose exercises such as walking, swimming, or cycling. Also try to exercise for at least 30 minutes on most days.    Raising (elevating) your legs lets gravity help blood flow back to the heart. Sit or lie with your feet above heart level a few times throughout the day, or as directed.    Avoid long periods of sitting or standing. Change positions often. Also, move your ankles, toes and knees often. This  may also help improve blood flow.    If you are overweight, talk with your healthcare provider about setting up a weight-loss plan. Maintaining a healthy weight can help reduce the strain on your veins. It may also improve symptoms, such as swelling and aching.    If you have dryness and itching, ask your provider about special lotions that can be applied to the skin to help improve symptoms.  Follow-up care  Follow up with your healthcare provider, or as directed. If imaging tests were done, you ll be told the results and if there are any new findings that affect your care.  When to seek medical advice  Call your healthcare provider right away if any of these occur:    Sudden, severe leg swelling, pain, or redness    Symptoms worsen, or they don t improve with self-care    Bleeding from any affected veins    Ulcers form on the legs, ankles, or feet    Fever of 100.4 F (38 C) or higher, or as advised by your provider  Date Last Reviewed: 9/21/2015 2000-2017 [employer]. [address] [place] [address]. All rights reserved. This information is not intended as a substitute for professional medical care. Always follow your healthcare professional's instructions.        Gout Diet  Gout is a painful condition caused by an excess of uric acid, a waste product made by the body. Uric acid forms crystals that collect in the joints. The immune response to these crystals brings on symptoms of joint pain and swelling. This is called a gout attack. Often, medications and diet changes are combined to manage gout. Below are some guidelines for changing your diet to help you manage gout and prevent attacks. Your health care provider will help you determine the best eating plan for you.     Eating to manage gout  Weight loss for those who are overweight may help reduce gout attacks.  Eat less of these foods  Eating too many foods containing purines may raise the levels of uric acid in your body. This  raises your risk for a gout attack. Try to limit these foods and drinks:    Alcohol, such as beer and red wine. You may be told to avoid alcohol completely.    Soft drinks that contain sugar or high fructose corn syrup    Certain fish, including anchovies, sardines, fish eggs, and herring    Shellfish    Certain meats, such as red meat, hot dogs, luncheon meats, and turkey    Organ meats, such as liver, kidneys, and sweetbreads    Legumes, such as dried beans and peas    Other high fat foods such as gravy, whole milk, and high fat cheeses    Vegetables such as asparagus, cauliflower, spinach, and mushrooms used to be thought to contribute to an increased risk for a gout attack, but recent studies show that high purine vegetables don't increase the risk for a gout attack.  Eat more of these foods  Other foods may be helpful for people with gout. Add some of these foods to your diet:    Cherries contain chemicals that may lower uric acid.    Omega fatty acids. These are found in some fatty fish such as salmon, certain oils (flax, olive, or nut), and nuts themselves. Omega fatty acids may help prevent inflammation due to gout.    Dairy products that are low-fat or fat-free, such as cheese and yogurt    Complex carbohydrate foods, including whole grains, brown rice, oats, and beans    Coffee, in moderation    Water, approximately 64 ounces per day  Follow-up care  Follow up with your healthcare provider as advised.  When to seek medical advice  Call your healthcare provider right away if any of these occur:    Return of gout symptoms, usually at night:    Severe pain, swelling, and heat in a joint, especially the base of the big toe    Affected joint is hard to move    Skin of the affected joint is purple or red    Fever of 100.4 F (38 C) or higher    Pain that doesn't get better even with prescribed medicine   Date Last Reviewed: 1/12/2016 2000-2017 [employer]. [address] [facility], [place]  [address]. All rights reserved. This information is not intended as a substitute for professional medical care. Always follow your healthcare professional's instructions.

## 2017-10-23 NOTE — TELEPHONE ENCOUNTER
Pt referred to [facility] by  for bilateral lower extremity varicose veins.    Pt needs to be scheduled for a consult with vascular surgery ([place] or [facility]).  Will route to scheduling to coordinate an appointment next available.    Radha Thomas RN BSN

## 2017-11-14 ENCOUNTER — OFFICE VISIT (OUTPATIENT)
Dept: VASCULAR SURGERY | Facility: CLINIC | Age: 62
End: 2017-11-14
Payer: COMMERCIAL

## 2017-11-14 VITALS — SYSTOLIC BLOOD PRESSURE: 131 MMHG | HEART RATE: 76 BPM | RESPIRATION RATE: 16 BRPM | DIASTOLIC BLOOD PRESSURE: 79 MMHG

## 2017-11-14 DIAGNOSIS — I83.891 BLEEDING FROM VARICOSE VEINS OF LOWER EXTREMITY, RIGHT: Primary | ICD-10-CM

## 2017-11-14 DIAGNOSIS — I87.2 VENOUS (PERIPHERAL) INSUFFICIENCY: ICD-10-CM

## 2017-11-14 PROCEDURE — 99203 OFFICE O/P NEW LOW 30 MIN: CPT | Performed by: SURGERY

## 2017-11-14 NOTE — MR AVS SNAPSHOT
"              After Visit Summary   2017    Stu Kee    MRN: [id number]           Patient Information     Date Of Birth          1955        Visit Information        Provider Department      2017 10:15 AM Jorge Ramires MD [facility]        Today's Diagnoses     Bleeding from varicose veins of lower extremity, right    -  1    Venous (peripheral) insufficiency           Follow-ups after your visit        Who to contact     If you have questions or need follow up information about today's clinic visit or your schedule please contact [facility] directly at [phone].  Normal or non-critical lab and imaging results will be communicated to you by [employer], letter or phone within 4 business days after the clinic has received the results. If you do not hear from us within 7 days, please contact the clinic through [employer] or phone. If you have a critical or abnormal lab result, we will notify you by phone as soon as possible.  Submit refill requests through [employer] or call your pharmacy and they will forward the refill request to us. Please allow 3 business days for your refill to be completed.          Additional Information About Your Visit        MyChart Information     [employer] lets you send messages to your doctor, view your test results, renew your prescriptions, schedule appointments and more. To sign up, go to [email or web address] . Click on \"Log in\" on the left side of the screen, which will take you to the Welcome page. Then click on \"Sign up Now\" on the right side of the page.     You will be asked to enter the access code listed below, as well as some personal information. Please follow the directions to create your username and password.     Your access code is: [email or web address]  Expires: 2018 10:45 AM     Your access code will  in 90 days. If you need help or a new code, please call your [facility] or [phone].        Care " EveryWhere ID     This is your Care EveryWhere ID. This could be used by other organizations to access your [facility] medical records  [id number]        Your Vitals Were     Pulse Respirations                76 16           Blood Pressure from Last 3 Encounters:   11/14/17 131/79   10/23/17 118/82   07/25/17 112/72    Weight from Last 3 Encounters:   10/23/17 (!) 341 lb (154.7 kg)   07/26/17 (!) 341 lb (154.7 kg)   07/25/17 (!) 341 lb (154.7 kg)              Today, you had the following     No orders found for display       Primary Care Provider Office Phone # Fax #    [facility] [phone]       [address] [facility] [address]        Equal Access to Services     SYED SHAIKH : Shaista wlikinso Sojazmin, waaxda luqadaha, qaybta kaalmada adeegyada, meir robison . So [facility] [phone].    ATENCIÓN: Si habla español, tiene a burton disposición servicios gratuitos de asistencia lingüística. Llame al [phone].    We comply with applicable federal civil rights laws and [place] laws. We do not discriminate on the basis of race, color, national origin, age, disability, sex, sexual orientation, or gender identity.            Thank you!     Thank you for choosing [facility]  for your care. Our goal is always to provide you with excellent care. Hearing back from our patients is one way we can continue to improve our services. Please take a few minutes to complete the written survey that you may receive in the mail after your visit with us. Thank you!             Your Updated Medication List - Protect others around you: Learn how to safely use, store and throw away your medicines at www.disposemymeds.org.          This list is accurate as of: 11/14/17 10:45 AM.  Always use your most recent med list.                   Brand Name Dispense Instructions for use Diagnosis    allopurinol 100 MG tablet    ZYLOPRIM    90 tablet    Take 1 tablet (100  mg) by mouth daily        ASPIRIN PO      Take 81 mg by mouth daily        * carvedilol 6.25 MG tablet    COREG    180 tablet    Take 1 tablet (6.25 mg) by mouth 2 times daily (with meals) Takes with the 12.5 tab    Essential hypertension, benign       * carvedilol 12.5 MG tablet    COREG    180 tablet    take 1 tablet 12.5 mg by mouth twice daily and take 1 tablet 6.25 mg twice daily    Essential hypertension, benign       CENTRUM SILVER per tablet      Take 1 tablet by mouth daily        chlorthalidone 25 MG tablet    HYGROTON    90 tablet    Take 1 tablet (25 mg) by mouth daily    Hypertension, goal below 140/90       lisinopril 20 MG tablet    PRINIVIL/ZESTRIL    180 tablet    Take 1 tablet (20 mg) by mouth 2 times daily    Hypertension, goal below 140/90       simvastatin 20 MG tablet    ZOCOR    90 tablet    Take 1 tablet (20 mg) by mouth At Bedtime    Hyperlipidemia LDL goal <160       * Notice:  This list has 2 medication(s) that are the same as other medications prescribed for you. Read the directions carefully, and ask your doctor or other care provider to review them with you.

## 2017-11-14 NOTE — LETTER
[facility] [address]  [place] [address]  Phone: [phone]  Fax: [phone]    2017    Re: Stu NGUYEN Chilango : 1955    Vascular Surgery Clinic note     CC:  Right ankle bleeding varicose vein     HPI:  60 yo male with a long history of lower extremity varicose veins and recent bleeding episode of the right lower leg varicosity.  He works as a  for many years and is on his feet a lot throughout the day.  He had a significant bleeding episode from a varicosity of the right lower leg just at the level of the ankle within the last 2 weeks.  He denies history of phlebitis, or burning pain, but does develop intense itching along the varicosities.  He currently does not wear compression hose and would like to start.  He denies a history of clotting disorder and no previous history of DVT.  He did have a traumatic injury of the left lower leg years ago that was difficult to heal and became infected.       ROS:  12 point ROS negative except those positives listed in the HPI.     Physical Examination:  /79 (BP Location: Right arm, Patient Position: Chair, Cuff Size: Adult Large)  Pulse 76  Resp 16  Gen: NAD, well developed male  HEENT: PERRLA  Lungs: Non-labored  Cardiac: RRR  Skin:  Bilateral lower extremities with evidence of venous stasis changes (lipodermatosclerosis) and varicosities noted of the right medial thigh and distal lower leg in the distribution of the GSV.  Spider veins over the posterior right knee.  No evidence of phlebitis.   Neuro: Normal gait, motor and sensory  Psych:  Normal mood, affect and judgement     Assessment: 60 yo male with bilateral venous insufficiency and right lower leg varicose vein bleeding     Plan:  I will see him at [facility] for a venous incompetency study and then review the results.  I think he will most likely have an incompetent GSV on the right and will need RFA with stab phlebectomy  with the recent varicose vein bleeding.  He is being measured and fitted for compression hose today.       Jorge Ramires MD  Vascular Surgery

## 2017-11-14 NOTE — PROGRESS NOTES
Vascular Surgery Clinic note    CC:  Right ankle bleeding varicose vein    HPI:  62 yo male with a long history of lower extremity varicose veins and recent bleeding episode of the right lower leg varicosity.  He works as a  for many years and is on his feet a lot throughout the day.  He had a significant bleeding episode from a varicosity of the right lower leg just at the level of the ankle within the last 2 weeks.  He denies history of phlebitis, or burning pain, but does develop intense itching along the varicosities.  He currently does not wear compression hose and would like to start.  He denies a history of clotting disorder and no previous history of DVT.  He did have a traumatic injury of the left lower leg years ago that was difficult to heal and became infected.        ROS:  12 point ROS negative except those positives listed in the HPI.    Allergies   Allergen Reactions     Amlodipine Besylate Swelling     Lisinopril      No past medical history on file.    Past Surgical History:   Procedure Laterality Date     APPENDECTOMY       ARTHROSCOPY KNEE RT/LT  age 36    Torn Men Left     LASIK       Social History     Social History     Marital status:      Spouse name: N/A     Number of children: N/A     Years of education: N/A     Occupational History     Not on file.     Social History Main Topics     Smoking status: Never Smoker     Smokeless tobacco: Never Used     Alcohol use Yes      Comment: 1 drink/month     Drug use: No     Sexual activity: Yes     Other Topics Concern     Parent/Sibling W/ Cabg, Mi Or Angioplasty Before 65f 55m? No     Social History Narrative     Family History   Problem Relation Age of Onset     HEART DISEASE Mother      HEART DISEASE Father      valve replaced     CANCER Father      CEREBROVASCULAR DISEASE Maternal Grandmother      Arrhythmia Maternal Grandmother      pacemaker     Arrhythmia Sister      pacemaker     DIABETES Sister      Hypertension Daughter       HEART DISEASE Other      Lung Cancer Sister      Physical Examination:  /79 (BP Location: Right arm, Patient Position: Chair, Cuff Size: Adult Large)  Pulse 76  Resp 16  Gen: NAD, well developed male  HEENT: PERRLA  Lungs: Non-labored  Cardiac: RRR  Skin:  Bilateral lower extremities with evidence of venous stasis changes (lipodermatosclerosis) and varicosities noted of the right medial thigh and distal lower leg in the distribution of the GSV.  Spider veins over the posterior right knee.  No evidence of phlebitis.   Neuro: Normal gait, motor and sensory  Psych:  Normal mood, affect and judgement        Assessment: 60 yo male with bilateral venous insufficiency and right lower leg varicose vein bleeding    Plan:  I will see him at [facility] for a venous incompetency study and then review the results.  I think he will most likely have an incompetent GSV on the right and will need RFA with stab phlebectomy with the recent varicose vein bleeding.  He is being measured and fitted for compression hose today.      Jorge Ramires MD  Vascular Surgery

## 2017-11-14 NOTE — NURSING NOTE
"Chief Complaint   Patient presents with     Consult     veins both legs       Initial /79 (BP Location: Right arm, Patient Position: Chair, Cuff Size: Adult Large)  Pulse 76  Resp 16 Estimated body mass index is 41.51 kg/(m^2) as calculated from the following:    Height as of 10/23/17: 1.93 m (6' 4\").    Weight as of 10/23/17: 154.7 kg (341 lb).  Medication Reconciliation: complete.  ryan diez LPN      "

## 2017-12-06 DIAGNOSIS — E78.5 HYPERLIPIDEMIA LDL GOAL <160: ICD-10-CM

## 2017-12-06 DIAGNOSIS — I10 ESSENTIAL HYPERTENSION, BENIGN: ICD-10-CM

## 2017-12-07 RX ORDER — SIMVASTATIN 20 MG
TABLET ORAL
Qty: 90 TABLET | Refills: 0 | Status: SHIPPED | OUTPATIENT
Start: 2017-12-07 | End: 2018-03-28

## 2017-12-07 RX ORDER — CARVEDILOL 6.25 MG/1
TABLET ORAL
Qty: 180 TABLET | Refills: 1 | Status: SHIPPED | OUTPATIENT
Start: 2017-12-07 | End: 2018-07-03

## 2018-02-19 DIAGNOSIS — I10 HYPERTENSION, GOAL BELOW 140/90: ICD-10-CM

## 2018-02-19 DIAGNOSIS — I10 HYPERTENSION, GOAL BELOW 140/90: Primary | ICD-10-CM

## 2018-02-19 RX ORDER — LISINOPRIL 20 MG/1
20 TABLET ORAL 2 TIMES DAILY
Qty: 180 TABLET | Refills: 1 | Status: SHIPPED | OUTPATIENT
Start: 2018-02-19 | End: 2018-09-05

## 2018-02-19 NOTE — TELEPHONE ENCOUNTER
"Requested Prescriptions   Pending Prescriptions Disp Refills     lisinopril (PRINIVIL/ZESTRIL) 20 MG tablet 180 tablet 1     Sig: Take 1 tablet (20 mg) by mouth 2 times daily    ACE Inhibitors (Including Combos) Protocol Failed    2/19/2018 10:35 AM       Failed - Normal serum creatinine on file in past 12 months    Recent Labs   Lab Test  09/16/16   1038   CR  1.11            Failed - Normal serum potassium on file in past 12 months    Recent Labs   Lab Test  09/16/16   1038   POTASSIUM  3.6            Passed - Blood pressure under 140/90 in past 12 months    BP Readings from Last 3 Encounters:   11/14/17 131/79   10/23/17 118/82   07/25/17 112/72                Passed - Recent or future visit with authorizing provider's specialty    Patient had office visit in the last year or has a visit in the next 30 days with authorizing provider.  See \"Patient Info\" tab in inbasket, or \"Choose Columns\" in Meds & Orders section of the refill encounter.            Passed - Patient is age 18 or older        Last Written Prescription Date:  4/10/17  Last Fill Quantity: 180,  # refills: 1  Last office visit: 10/23/2017 with prescribing provider:  Venkat   Future Office Visit:      "

## 2018-02-20 ENCOUNTER — TELEPHONE (OUTPATIENT)
Dept: FAMILY MEDICINE | Facility: CLINIC | Age: 63
End: 2018-02-20

## 2018-02-20 DIAGNOSIS — I10 HYPERTENSION, GOAL BELOW 140/90: ICD-10-CM

## 2018-02-20 DIAGNOSIS — E78.5 HYPERLIPIDEMIA LDL GOAL <160: ICD-10-CM

## 2018-02-20 LAB
ANION GAP SERPL CALCULATED.3IONS-SCNC: 7 MMOL/L (ref 3–14)
BUN SERPL-MCNC: 14 MG/DL (ref 7–30)
CALCIUM SERPL-MCNC: 8.9 MG/DL (ref 8.5–10.1)
CHLORIDE SERPL-SCNC: 101 MMOL/L (ref 94–109)
CHOLEST SERPL-MCNC: 134 MG/DL
CO2 SERPL-SCNC: 30 MMOL/L (ref 20–32)
CREAT SERPL-MCNC: 0.96 MG/DL (ref 0.66–1.25)
GFR SERPL CREATININE-BSD FRML MDRD: 79 ML/MIN/1.7M2
GLUCOSE SERPL-MCNC: 129 MG/DL (ref 70–99)
HDLC SERPL-MCNC: 48 MG/DL
LDLC SERPL CALC-MCNC: 61 MG/DL
NONHDLC SERPL-MCNC: 86 MG/DL
POTASSIUM SERPL-SCNC: 3.3 MMOL/L (ref 3.4–5.3)
SODIUM SERPL-SCNC: 138 MMOL/L (ref 133–144)
TRIGL SERPL-MCNC: 126 MG/DL

## 2018-02-20 PROCEDURE — 80061 LIPID PANEL: CPT | Performed by: FAMILY MEDICINE

## 2018-02-20 PROCEDURE — 36415 COLL VENOUS BLD VENIPUNCTURE: CPT | Performed by: FAMILY MEDICINE

## 2018-02-20 PROCEDURE — 80048 BASIC METABOLIC PNL TOTAL CA: CPT | Performed by: FAMILY MEDICINE

## 2018-02-20 NOTE — TELEPHONE ENCOUNTER
Prior Authorization Retail Medication Request  Medication/Dose: LISINOPRIL  Diagnosis and ICD code: Hypertension, goal below 140/90 [I10]   New/Renewal/Insurance Change PA: renewal?  Previously Tried and Failed Therapies: ?    Insurance ID (if provided): [id number]  Insurance Phone (if provided): [phone]    Any additional info from fax request:     If you received a fax notification from an outside Pharmacy:  Pharmacy Name:[facility]  Pharmacy #:[phone]  Pharmacy Fax:[phone]

## 2018-02-21 DIAGNOSIS — R73.9 BLOOD GLUCOSE ELEVATED: ICD-10-CM

## 2018-02-21 DIAGNOSIS — E87.6 HYPOKALEMIA: Primary | ICD-10-CM

## 2018-02-22 NOTE — TELEPHONE ENCOUNTER
Prior Authorization Approval    Authorization Effective Date: 2/22/2018  Authorization Expiration Date: 2/22/2019  Medication: LISINOPRIL - APPROVED  Approved Dose/Quantity: 180 for 90 days  Reference #:     Insurance Company: [employer] - Phone [phone] Fax [phone]  Expected CoPay: $7.33     CoPay Card Available:      Foundation Assistance Needed:    Which Pharmacy is filling the prescription (Not needed for infusion/clinic administered): [facility] [address] [place] [address]  Pharmacy Notified: yes    Patient Notified:yes    Called INS and got PA override approved -

## 2018-03-28 DIAGNOSIS — E78.5 HYPERLIPIDEMIA LDL GOAL <160: ICD-10-CM

## 2018-03-28 RX ORDER — SIMVASTATIN 20 MG
TABLET ORAL
Qty: 30 TABLET | Refills: 0 | Status: SHIPPED | OUTPATIENT
Start: 2018-03-28 | End: 2018-09-05

## 2018-03-28 NOTE — TELEPHONE ENCOUNTER
"Requested Prescriptions   Pending Prescriptions Disp Refills     simvastatin (ZOCOR) 20 MG tablet [Pharmacy Med Name: SIMVASTATIN 20MG  TAB] 90 tablet 0     Sig: TAKE ONE TABLET BY MOUTH AT BEDTIME -NEEDS  FASTING  LIPIDS  FOR  FURTHER  REFILLS-    Statins Protocol Passed    3/28/2018  5:31 AM       Passed - LDL on file in past 12 months    Recent Labs   Lab Test  02/20/18   0916   LDL  61            Passed - No abnormal creatine kinase in past 12 months    Recent Labs   Lab Test  08/23/16   1219   CKT  169               Passed - Recent (12 mo) or future (30 days) visit within the authorizing provider's specialty    Patient had office visit in the last 12 months or has a visit in the next 30 days with authorizing provider or within the authorizing provider's specialty.  See \"Patient Info\" tab in inbasket, or \"Choose Columns\" in Meds & Orders section of the refill encounter.           Passed - Patient is age 18 or older        Last Written Prescription Date:  12/7/17  Last Fill Quantity: 90,  # refills: 0   Last office visit: 10/23/2017 with prescribing provider:     Future Office Visit:      "

## 2018-03-28 NOTE — TELEPHONE ENCOUNTER
Medication is being filled for 1 time refill only due to:  Patient needs to be seen because due for yearly medication review..  Reminder placed on pharmacy notes.    JOSEE San

## 2018-04-13 DIAGNOSIS — I10 HYPERTENSION, GOAL BELOW 140/90: ICD-10-CM

## 2018-04-13 RX ORDER — CHLORTHALIDONE 25 MG/1
TABLET ORAL
Qty: 90 TABLET | Refills: 1 | Status: SHIPPED | OUTPATIENT
Start: 2018-04-13

## 2018-05-17 DIAGNOSIS — E78.5 HYPERLIPIDEMIA LDL GOAL <160: ICD-10-CM

## 2018-05-17 RX ORDER — SIMVASTATIN 20 MG
TABLET ORAL
Qty: 90 TABLET | Refills: 2 | Status: SHIPPED | OUTPATIENT
Start: 2018-05-17

## 2018-05-17 NOTE — TELEPHONE ENCOUNTER
"Requested Prescriptions   Pending Prescriptions Disp Refills     simvastatin (ZOCOR) 20 MG tablet [Pharmacy Med Name: SIMVASTATIN 20MG  TAB] 90 tablet 0     Sig: TAKE ONE TABLET BY MOUTH AT BEDTIME -NEEDS  FASTING  LIPIDS  FOR  FURTHER  REFILLS-    Statins Protocol Passed    5/17/2018  7:00 AM       Passed - LDL on file in past 12 months    Recent Labs   Lab Test  02/20/18   0916   LDL  61            Passed - No abnormal creatine kinase in past 12 months    Recent Labs   Lab Test  08/23/16   1219   CKT  169               Passed - Recent (12 mo) or future (30 days) visit within the authorizing provider's specialty    Patient had office visit in the last 12 months or has a visit in the next 30 days with authorizing provider or within the authorizing provider's specialty.  See \"Patient Info\" tab in inbasket, or \"Choose Columns\" in Meds & Orders section of the refill encounter.           Passed - Patient is age 18 or older        Last Written Prescription Date:  3/28/18  Last Fill Quantity: 30,  # refills: 0   Last office visit: 10/23/2017 with prescribing provider:  TELMA   Future Office Visit:      "

## 2018-06-07 ENCOUNTER — TELEPHONE (OUTPATIENT)
Dept: FAMILY MEDICINE | Facility: CLINIC | Age: 63
End: 2018-06-07

## 2018-06-07 DIAGNOSIS — I10 ESSENTIAL HYPERTENSION, BENIGN: ICD-10-CM

## 2018-06-07 RX ORDER — CARVEDILOL 12.5 MG/1
TABLET ORAL
Qty: 180 TABLET | Refills: 1 | Status: SHIPPED | OUTPATIENT
Start: 2018-06-07

## 2018-06-07 NOTE — TELEPHONE ENCOUNTER
Received fax from [facility] stating the Lisinopril 20 mg tablet take 1 tablet by mouth twice daily exceeds insurance max qty for 3 months supply. Use higher strength 1 tab daily.    Please advise.    Ana Neal-Station

## 2018-06-07 NOTE — TELEPHONE ENCOUNTER
BP Readings from Last 6 Encounters:   11/14/17 131/79   10/23/17 118/82   07/25/17 112/72   04/12/17 126/82   12/20/16 128/77   11/16/16 135/82     Notes Recorded by Orlando Robledo MD on 2/21/2018 at 8:37 PM  Electrolytes showed slightly low potassium level and elevated blood glucose.  Would recommend to take potassium rich foods e.g coconut water, spinach, salmon.  We should also do fasting blood glucose and A1c to rule out diabetes, order placed.  Other electrolytes and kidney function came back normal. Let us know if there are any questions.    Spoke with pharmacist who will do override for 20 mg tab one BID.  Pt informed, also advised due for F/U fasting lab as noted above. He will schedule fasting lab appt in near future.  CHRISTIANA Houser RN

## 2018-07-03 DIAGNOSIS — I10 ESSENTIAL HYPERTENSION, BENIGN: ICD-10-CM

## 2018-07-03 RX ORDER — CARVEDILOL 6.25 MG/1
TABLET ORAL
Qty: 180 TABLET | Refills: 1 | Status: SHIPPED | OUTPATIENT
Start: 2018-07-03

## 2018-09-05 ENCOUNTER — OFFICE VISIT (OUTPATIENT)
Dept: FAMILY MEDICINE | Facility: CLINIC | Age: 63
End: 2018-09-05
Payer: COMMERCIAL

## 2018-09-05 VITALS
SYSTOLIC BLOOD PRESSURE: 138 MMHG | TEMPERATURE: 98.5 F | DIASTOLIC BLOOD PRESSURE: 76 MMHG | WEIGHT: 315 LBS | RESPIRATION RATE: 20 BRPM | HEIGHT: 76 IN | HEART RATE: 80 BPM | BODY MASS INDEX: 38.36 KG/M2

## 2018-09-05 DIAGNOSIS — H69.92 DYSFUNCTION OF LEFT EUSTACHIAN TUBE: ICD-10-CM

## 2018-09-05 DIAGNOSIS — J34.89 RHINORRHEA: Primary | ICD-10-CM

## 2018-09-05 DIAGNOSIS — I10 HYPERTENSION, GOAL BELOW 140/90: ICD-10-CM

## 2018-09-05 PROCEDURE — 99213 OFFICE O/P EST LOW 20 MIN: CPT | Performed by: NURSE PRACTITIONER

## 2018-09-05 RX ORDER — LISINOPRIL 20 MG/1
TABLET ORAL
Qty: 180 TABLET | Refills: 1 | Status: SHIPPED | OUTPATIENT
Start: 2018-09-05

## 2018-09-05 RX ORDER — LORATADINE 10 MG/1
10 TABLET ORAL DAILY
Qty: 90 TABLET | Refills: 1 | Status: SHIPPED | OUTPATIENT
Start: 2018-09-05 | End: 2019-12-02

## 2018-09-05 RX ORDER — FLUTICASONE PROPIONATE 50 MCG
1-2 SPRAY, SUSPENSION (ML) NASAL DAILY
Qty: 1 BOTTLE | Refills: 0 | Status: SHIPPED | OUTPATIENT
Start: 2018-09-05 | End: 2019-12-02

## 2018-09-05 NOTE — NURSING NOTE
"Chief Complaint   Patient presents with     Ear Problem       Initial /76 (BP Location: Right arm, Patient Position: Sitting, Cuff Size: Adult Large)  Pulse 80  Temp 98.5  F (36.9  C) (Tympanic)  Resp 20  Ht 6' 4\" (1.93 m)  Wt (!) 340 lb (154.2 kg)  BMI 41.39 kg/m2 Estimated body mass index is 41.39 kg/(m^2) as calculated from the following:    Height as of this encounter: 6' 4\" (1.93 m).    Weight as of this encounter: 340 lb (154.2 kg).      Health Maintenance that is potentially due pending provider review:  NONE    n/a    Is there anyone who you would like to be able to receive your results? No  If yes have patient fill out LISE    "

## 2018-09-05 NOTE — TELEPHONE ENCOUNTER
"Requested Prescriptions   Pending Prescriptions Disp Refills     lisinopril (PRINIVIL/ZESTRIL) 20 MG tablet [Pharmacy Med Name: LISINOPRIL 20MG     TAB] 180 tablet 1     Sig: TAKE 1 TABLET BY MOUTH TWICE DAILY    ACE Inhibitors (Including Combos) Protocol Failed    9/5/2018  9:52 AM       Failed - Normal serum potassium on file in past 12 months    Recent Labs   Lab Test  02/20/18   0916   POTASSIUM  3.3*            Passed - Blood pressure under 140/90 in past 12 months    BP Readings from Last 3 Encounters:   09/05/18 138/76   11/14/17 131/79   10/23/17 118/82                Passed - Recent (12 mo) or future (30 days) visit within the authorizing provider's specialty    Patient had office visit in the last 12 months or has a visit in the next 30 days with authorizing provider or within the authorizing provider's specialty.  See \"Patient Info\" tab in inbasket, or \"Choose Columns\" in Meds & Orders section of the refill encounter.           Passed - Patient is age 18 or older       Passed - Normal serum creatinine on file in past 12 months    Recent Labs   Lab Test  02/20/18   0916   CR  0.96               "

## 2018-09-05 NOTE — PATIENT INSTRUCTIONS
1. Rhinorrhea  Acute  - loratadine (CLARITIN) 10 MG tablet; Take 1 tablet (10 mg) by mouth daily  Dispense: 90 tablet; Refill: 1  - fluticasone (FLONASE) 50 MCG/ACT spray; Spray 1-2 sprays into both nostrils daily  Dispense: 1 Bottle; Refill: 0    2. Dysfunction of left eustachian tube  Acute  - loratadine (CLARITIN) 10 MG tablet; Take 1 tablet (10 mg) by mouth daily  Dispense: 90 tablet; Refill: 1  - fluticasone (FLONASE) 50 MCG/ACT spray; Spray 1-2 sprays into both nostrils daily  Dispense: 1 Bottle; Refill: 0      Anatomy of the Ear    The ear is a complex and delicate organ. It collects sound waves so you can hear the world around you. The ear also has a second function--it helps you keep your balance. Your ear can be divided into 3 parts. The outer ear and middle ear help collect and amplify sound. The inner ear converts sound waves to messages that are sent to the brain. The inner ear also senses the movement and position of your head and body so you can maintain your balance and see clearly, even when you change positions.  The mastoid bone surrounds the middle ear. The external ear collects sound waves. The ear canal carries sound waves to the eardrum. The eardrum vibrates from sound waves, setting the middle ear bones in motion. The middle ear bones (ossicles) vibrate, transmitting sound waves to the inner ear. When the ear is healthy, air pressure remains balanced in the middle ear. The eustachian tube helps control air pressure in the middle ear. The semicircular canals help maintain balance. The vestibular nerve carries balance signals to the brain. The auditory nerve carries sound signals to the brain. The cochlea picks up sound waves and makes nerve signals.     Date Last Reviewed: 10/1/2016    [id number] [employer]. [address] [place] [address], [place] [address]. All rights reserved. This information is not intended as a substitute for professional medical care. Always follow your healthcare  professional's instructions.

## 2018-09-05 NOTE — PROGRESS NOTES
SUBJECTIVE:   Stu Kee is a 62 year old male who presents to clinic today for the following health issues:      Ear problem       Duration: 10 days     Description (location/character/radiation): Lt ear     Intensity:  No pain     Accompanying signs and symptoms: Feels plugged, decreased hearing and ringing     History (similar episodes/previous evaluation): None    Precipitating or alleviating factors: None    Therapies tried and outcome: decongestant     Usually gets seasonal allergies- hasn't started anything yet for runny nose  HPI:   PCP:  Orlando Robledo [phone]    Patient Active Problem List   Diagnosis     CARDIOVASCULAR SCREENING; LDL GOAL LESS THAN 160     Advanced directives, counseling/discussion     Hypertension, goal below 140/90     Aortic valve replaced     Abscess of leg, left s/p I+D      Traumatic open wound of lower leg with infection, left, subsequent encounter     Acute idiopathic gout of left foot     DIAGNOSIS NOT YET DEFINED     Venous (peripheral) insufficiency     Bleeding from varicose veins of lower extremity, right     Current Outpatient Prescriptions   Medication     allopurinol (ZYLOPRIM) 100 MG tablet     ASPIRIN PO     carvedilol (COREG) 12.5 MG tablet     carvedilol (COREG) 6.25 MG tablet     chlorthalidone (HYGROTON) 25 MG tablet     fluticasone (FLONASE) 50 MCG/ACT spray     lisinopril (PRINIVIL/ZESTRIL) 20 MG tablet     loratadine (CLARITIN) 10 MG tablet     Multiple Vitamins-Minerals (CENTRUM SILVER) per tablet     simvastatin (ZOCOR) 20 MG tablet     [DISCONTINUED] simvastatin (ZOCOR) 20 MG tablet     No current facility-administered medications for this visit.        Health Maintenance Due   Topic Date Due     HIV SCREEN (SYSTEM ASSIGNED)  11/27/1971     HEPATITIS C SCREENING  11/27/1971     PHQ-2 Q1 YR  08/11/2017     INFLUENZA VACCINE (1) 09/01/2018       Reviewed and updated:  Tobacco  Allergies  Meds  Med Hx  Surg Hx  Fam Hx  Soc Hx  "    ROS:  Constitutional, HEENT, cardiovascular, pulmonary, gi and gu systems are negative, except as otherwise noted.    PHYSICAL EXAM:   /76 (BP Location: Right arm, Patient Position: Sitting, Cuff Size: Adult Large)  Pulse 80  Temp 98.5  F (36.9  C) (Tympanic)  Resp 20  Ht 6' 4\" (1.93 m)  Wt (!) 340 lb (154.2 kg)  BMI 41.39 kg/m2  Body mass index is 41.39 kg/(m^2).  GENERAL APPEARANCE: healthy, alert, no distress and over weight  EYES: Eyes grossly normal to inspection, PERRL and conjunctivae and sclerae normal  HENT: ear canals and TM's normal, nose and mouth without ulcers or lesions and fluid behind LEFT TM  NECK: no adenopathy, no asymmetry, masses, or scars and thyroid normal to palpation  RESP: lungs clear to auscultation - no rales, rhonchi or wheezes  CV: regular rates and rhythm, normal S1 S2, no S3 or S4 and no murmur, click or rub  MS: extremities normal- no gross deformities noted  PSYCH: mentation appears normal and affect normal/bright    ASSESSMENT & PLAN:     1. Rhinorrhea  Acute- likely allergies  - loratadine (CLARITIN) 10 MG tablet; Take 1 tablet (10 mg) by mouth daily  Dispense: 90 tablet; Refill: 1  - fluticasone (FLONASE) 50 MCG/ACT spray; Spray 1-2 sprays into both nostrils daily  Dispense: 1 Bottle; Refill: 0    2. Dysfunction of left eustachian tube  Acute  - loratadine (CLARITIN) 10 MG tablet; Take 1 tablet (10 mg) by mouth daily  Dispense: 90 tablet; Refill: 1  - fluticasone (FLONASE) 50 MCG/ACT spray; Spray 1-2 sprays into both nostrils daily  Dispense: 1 Bottle; Refill: 0      Anatomy of the Ear    The ear is a complex and delicate organ. It collects sound waves so you can hear the world around you. The ear also has a second function--it helps you keep your balance. Your ear can be divided into 3 parts. The outer ear and middle ear help collect and amplify sound. The inner ear converts sound waves to messages that are sent to the brain. The inner ear also senses the " movement and position of your head and body so you can maintain your balance and see clearly, even when you change positions.  The mastoid bone surrounds the middle ear. The external ear collects sound waves. The ear canal carries sound waves to the eardrum. The eardrum vibrates from sound waves, setting the middle ear bones in motion. The middle ear bones (ossicles) vibrate, transmitting sound waves to the inner ear. When the ear is healthy, air pressure remains balanced in the middle ear. The eustachian tube helps control air pressure in the middle ear. The semicircular canals help maintain balance. The vestibular nerve carries balance signals to the brain. The auditory nerve carries sound signals to the brain. The cochlea picks up sound waves and makes nerve signals.     Date Last Reviewed: 10/1/2016    [id number] [employer]. [address] [place] [id number]. All rights reserved. This information is not intended as a substitute for professional medical care. Always follow your healthcare professional's instructions.          Risks, benefits, side effects and rationale for treatment plan fully discussed with the patient and understanding expressed.    Deanna Hay, FNP-BC  [facility]

## 2018-09-05 NOTE — MR AVS SNAPSHOT
After Visit Summary   9/5/2018    Stu Kee    MRN: [id number]           Patient Information     Date Of Birth          1955        Visit Information        Provider Department      9/5/2018 9:00 AM Deanna Hay, CNP [facility]        Today's Diagnoses     Rhinorrhea    -  1    Dysfunction of left eustachian tube          Care Instructions    1. Rhinorrhea  Acute  - loratadine (CLARITIN) 10 MG tablet; Take 1 tablet (10 mg) by mouth daily  Dispense: 90 tablet; Refill: 1  - fluticasone (FLONASE) 50 MCG/ACT spray; Spray 1-2 sprays into both nostrils daily  Dispense: 1 Bottle; Refill: 0    2. Dysfunction of left eustachian tube  Acute  - loratadine (CLARITIN) 10 MG tablet; Take 1 tablet (10 mg) by mouth daily  Dispense: 90 tablet; Refill: 1  - fluticasone (FLONASE) 50 MCG/ACT spray; Spray 1-2 sprays into both nostrils daily  Dispense: 1 Bottle; Refill: 0      Anatomy of the Ear    The ear is a complex and delicate organ. It collects sound waves so you can hear the world around you. The ear also has a second function--it helps you keep your balance. Your ear can be divided into 3 parts. The outer ear and middle ear help collect and amplify sound. The inner ear converts sound waves to messages that are sent to the brain. The inner ear also senses the movement and position of your head and body so you can maintain your balance and see clearly, even when you change positions.  The mastoid bone surrounds the middle ear. The external ear collects sound waves. The ear canal carries sound waves to the eardrum. The eardrum vibrates from sound waves, setting the middle ear bones in motion. The middle ear bones (ossicles) vibrate, transmitting sound waves to the inner ear. When the ear is healthy, air pressure remains balanced in the middle ear. The eustachian tube helps control air pressure in the middle ear. The semicircular canals help maintain balance. The vestibular nerve  "carries balance signals to the brain. The auditory nerve carries sound signals to the brain. The cochlea picks up sound waves and makes nerve signals.     Date Last Reviewed: 10/1/2016    9626-6395 [employer]. [address] [place] [address], [place] [address]. All rights reserved. This information is not intended as a substitute for professional medical care. Always follow your healthcare professional's instructions.                Follow-ups after your visit        Follow-up notes from your care team     Return if symptoms worsen or fail to improve.      Who to contact     If you have questions or need follow up information about today's clinic visit or your schedule please contact [facility] directly at [phone].  Normal or non-critical lab and imaging results will be communicated to you by MyChart, letter or phone within 4 business days after the clinic has received the results. If you do not hear from us within 7 days, please contact the clinic through MyChart or phone. If you have a critical or abnormal lab result, we will notify you by phone as soon as possible.  Submit refill requests through [employer] or call your pharmacy and they will forward the refill request to us. Please allow 3 business days for your refill to be completed.          Additional Information About Your Visit        Care EveryWhere ID     This is your Care EveryWhere ID. This could be used by other organizations to access your [facility] medical records  [id number]        Your Vitals Were     Pulse Temperature Respirations Height BMI (Body Mass Index)       80 98.5  F (36.9  C) (Tympanic) 20 6' 4\" (1.93 m) 41.39 kg/m2        Blood Pressure from Last 3 Encounters:   09/05/18 138/76   11/14/17 131/79   10/23/17 118/82    Weight from Last 3 Encounters:   09/05/18 (!) 340 lb (154.2 kg)   10/23/17 (!) 341 lb (154.7 kg)   07/26/17 (!) 341 lb (154.7 kg)              Today, you had the following     No orders found for " display         Today's Medication Changes          These changes are accurate as of 9/5/18  9:35 AM.  If you have any questions, ask your nurse or doctor.               Start taking these medicines.        Dose/Directions    fluticasone 50 MCG/ACT spray   Commonly known as:  FLONASE   Used for:  Rhinorrhea, Dysfunction of left eustachian tube   Started by:  Deanna Hay CNP        Dose:  1-2 spray   Spray 1-2 sprays into both nostrils daily   Quantity:  1 Bottle   Refills:  0       loratadine 10 MG tablet   Commonly known as:  CLARITIN   Used for:  Rhinorrhea, Dysfunction of left eustachian tube   Started by:  Deanna Hay CNP        Dose:  10 mg   Take 1 tablet (10 mg) by mouth daily   Quantity:  90 tablet   Refills:  1            Where to get your medicines      These medications were sent to [facility] [address] [place] [address] [address]  [address] [address]     Phone:  [phone]     fluticasone 50 MCG/ACT spray    loratadine 10 MG tablet                Primary Care Provider Office Phone # Fax #    Orlando Justino Robledo [phone]       [address] [facility] [address]        Equal Access to Services     SYED SHAIKH AH: Hadjulian wilkinso Soortegaali, waaxda luqadaha, qaybta kaalmada adeegyada, meir emerson. So [facility] [phone].    ATENCIÓN: Si habla español, tiene a burton disposición servicios gratuitos de asistencia lingüística. Adiename al [phone].    We comply with applicable federal civil rights laws and [place] laws. We do not discriminate on the basis of race, color, national origin, age, disability, sex, sexual orientation, or gender identity.            Thank you!     Thank you for choosing [facility]  for your care. Our goal is always to provide you with excellent care. Hearing back from our patients is one way we can continue to improve our services. Please take a few minutes to complete  the written survey that you may receive in the mail after your visit with us. Thank you!             Your Updated Medication List - Protect others around you: Learn how to safely use, store and throw away your medicines at www.[employer].org.          This list is accurate as of 9/5/18  9:35 AM.  Always use your most recent med list.                   Brand Name Dispense Instructions for use Diagnosis    allopurinol 100 MG tablet    ZYLOPRIM    90 tablet    Take 1 tablet (100 mg) by mouth daily        ASPIRIN PO      Take 81 mg by mouth daily        * carvedilol 12.5 MG tablet    COREG    180 tablet    TAKE ONE TABLET BY MOUTH TWICE DAILY TAKE  WITH  6.25MG  TABS    Essential hypertension, benign       * carvedilol 6.25 MG tablet    COREG    180 tablet    TAKE ONE TABLET BY MOUTH TWICE DAILY WITH MEALS WITH  12.5  TAB    Essential hypertension, benign       CENTRUM SILVER per tablet      Take 1 tablet by mouth daily        chlorthalidone 25 MG tablet    HYGROTON    90 tablet    TAKE ONE TABLET BY MOUTH ONCE DAILY    Hypertension, goal below 140/90       fluticasone 50 MCG/ACT spray    FLONASE    1 Bottle    Spray 1-2 sprays into both nostrils daily    Rhinorrhea, Dysfunction of left eustachian tube       lisinopril 20 MG tablet    PRINIVIL/ZESTRIL    180 tablet    Take 1 tablet (20 mg) by mouth 2 times daily    Hypertension, goal below 140/90       loratadine 10 MG tablet    CLARITIN    90 tablet    Take 1 tablet (10 mg) by mouth daily    Rhinorrhea, Dysfunction of left eustachian tube       simvastatin 20 MG tablet    ZOCOR    90 tablet    TAKE ONE TABLET BY MOUTH AT BEDTIME -NEEDS  FASTING  LIPIDS  FOR  FURTHER  REFILLS-    Hyperlipidemia LDL goal <160       * Notice:  This list has 2 medication(s) that are the same as other medications prescribed for you. Read the directions carefully, and ask your doctor or other care provider to review them with you.

## 2019-11-26 NOTE — PROGRESS NOTES
Chief Complaint   Patient presents with     Ent Problem     unable to breathe through his nose - did have sleep study done - [facility] was told to do nasal spray and medication which made his symptoms worse     History of Present Illness   Stu Kee is a 63 year old male who presents today for evaluation.  The patient describes symptoms of bilateral nasal obstruction since the patient was a teenager.  He denies any significant rhinorrhea, postnasal drainage, taste or smell disturbance, face pain/pressure/fullness.  He not had previous nose or sinus surgery.  He is tried topical nasal steroid and antihistamine sprays without benefit.  He tried oral dyspnea without benefit.  He tried Vicks in his nose which helped some.  He does struggle with some nasal dryness.  He does not have any nose or sinus imaging.     The patient does report bilateral hearing loss and bilateral tinnitus.  He is undergone previous audiometric assessment which does show normal sloping to moderately severe sensorineural hearing loss in both ears.  He does have hearing weight aids but does not wear them.  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  He denies any otalgia, otorrhea, bloody otorrhea, dizziness, or previous ear surgery.     Patient has severe obstructive sleep apnea with AHI of 35.8 events per hour.  He has not been fitted with CPAP.    Past Medical History  Patient Active Problem List   Diagnosis     CARDIOVASCULAR SCREENING; LDL GOAL LESS THAN 160     Advanced directives, counseling/discussion     Hypertension, goal below 140/90     Aortic valve replaced     Abscess of leg, left s/p I+D      Traumatic open wound of lower leg with infection, left, subsequent encounter     Acute idiopathic gout of left foot     DIAGNOSIS NOT YET DEFINED     Venous (peripheral) insufficiency     Bleeding from varicose veins of lower extremity, right     Morbid obesity (H)     Current Medications      Current Outpatient Medications:      ASPIRIN PO, Take 81 mg by mouth daily , Disp: , Rfl:      carvedilol (COREG) 12.5 MG tablet, TAKE ONE TABLET BY MOUTH TWICE DAILY TAKE  WITH  6.25MG  TABS, Disp: 180 tablet, Rfl: 1     carvedilol (COREG) 6.25 MG tablet, TAKE ONE TABLET BY MOUTH TWICE DAILY WITH MEALS WITH  12.5  TAB, Disp: 180 tablet, Rfl: 1     chlorthalidone (HYGROTON) 25 MG tablet, TAKE ONE TABLET BY MOUTH ONCE DAILY, Disp: 90 tablet, Rfl: 1     lisinopril (PRINIVIL/ZESTRIL) 20 MG tablet, TAKE 1 TABLET BY MOUTH TWICE DAILY, Disp: 180 tablet, Rfl: 1     simvastatin (ZOCOR) 20 MG tablet, TAKE ONE TABLET BY MOUTH AT BEDTIME -NEEDS  FASTING  LIPIDS  FOR  FURTHER  REFILLS-, Disp: 90 tablet, Rfl: 2     allopurinol (ZYLOPRIM) 100 MG tablet, Take 1 tablet (100 mg) by mouth daily (Patient not taking: Reported on 12/2/2019), Disp: 90 tablet, Rfl: 1     Multiple Vitamins-Minerals (CENTRUM SILVER) per tablet, Take 1 tablet by mouth daily, Disp: , Rfl:     Allergies  Allergies   Allergen Reactions     Amlodipine Besylate Swelling       Social History   Social History     Socioeconomic History     Marital status:      Spouse name: Not on file     Number of children: Not on file     Years of education: Not on file     Highest education level: Not on file   Occupational History     Not on file   Social Needs     Financial resource strain: Not on file     Food insecurity:     Worry: Not on file     Inability: Not on file     Transportation needs:     Medical: Not on file     Non-medical: Not on file   Tobacco Use     Smoking status: Never Smoker     Smokeless tobacco: Never Used   Substance and Sexual Activity     Alcohol use: Yes     Comment: 1 drink/month     Drug use: No     Sexual activity: Yes   Lifestyle     Physical activity:     Days per week: Not on file     Minutes per session: Not on file     Stress: Not on file   Relationships     Social connections:     Talks on phone: Not on file     Gets together: Not  on file     Attends [employer] service: Not on file     Active member of club or organization: Not on file     Attends meetings of clubs or organizations: Not on file     Relationship status: Not on file     Intimate partner violence:     Fear of current or ex partner: Not on file     Emotionally abused: Not on file     Physically abused: Not on file     Forced sexual activity: Not on file   Other Topics Concern     Parent/sibling w/ CABG, MI or angioplasty before 65F 55M? No   Social History Narrative     Not on file       Family History  Family History   Problem Relation Age of Onset     Heart Disease Mother      Heart Disease Father         valve replaced     Cancer Father      Cerebrovascular Disease Maternal Grandmother      Arrhythmia Maternal Grandmother         pacemaker     Arrhythmia Sister         pacemaker     Diabetes Sister      Hypertension Daughter      Heart Disease Other      Lung Cancer Sister        Review of Systems  As per HPI and PMHx, otherwise 10+ comprehensive system review is negative.    Physical Exam  /81 (BP Location: Right arm, Patient Position: Chair, Cuff Size: Adult Large)   Pulse 82   Temp 98.2  F (36.8  C)   Wt (!) 154.2 kg (340 lb)   BMI 41.39 kg/m    GENERAL: Patient is a pleasant, cooperative 63 year old male in no acute distress.  HEAD: Normocephalic, atraumatic.  Hair and scalp are normal.  EYES: Pupils are equal, round, reactive to light and accommodation.  Extraocular movements are intact.  The sclera nonicteric without injection.  The extraocular structures are normal.  EARS: See below.  NOSE: The patient has nasal skin.  His bony nasal dorsum is recently straight.  His middle third is very pinched.  He has adequate projection at the tip and base.  The patient has significant sagittal malpositioning of his lower lateral crura with a ball-tipped appearance on his nose.  He has obvious external nasal valve collapse with gentle inhalation.  Modified caudal maneuver  leads to significant improvement in nasal breathing bilaterally.  Nasal mucosa is slightly dry.  Nasal septum is essentially midline.  The inferior turbinates are moderately hypertrophied.  No nasal cavity masses, polyps, or mucopurulence on anterior rhinoscopy.  NEUROLOGIC: Cranial nerves II through XII are grossly intact.  Voice is strong.  Patient is House-Brackman I/VI bilaterally.  CARDIOVASCULAR: Extremities are warm and well-perfused.  No significant peripheral edema.  RESPIRATORY: Patient has nonlabored breathing without cough, wheeze, stridor.  PSYCHIATRIC: Patient is alert and oriented.  Mood and affect appear normal.  SKIN: Warm and dry.  No scalp, face, or neck lesions noted.    Assessment and Plan     ICD-10-CM    1. Nasal obstruction J34.89 OTOLARYNGOLOGY REFERRAL   2. 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 Nasal deformity M95.0 OTOLARYNGOLOGY REFERRAL   4. Obstructive sleep apnea syndrome G47.33 OTOLARYNGOLOGY REFERRAL   5. Sensorineural hearing loss, bilateral H90.3 OTOLARYNGOLOGY REFERRAL   6. Tinnitus, bilateral H93.13 OTOLARYNGOLOGY REFERRAL   7. Severe obstructive sleep apnea G47.33 OTOLARYNGOLOGY REFERRAL   8. Morbid obesity (H) E66.01 OTOLARYNGOLOGY REFERRAL   9. Bilateral impacted cerumen H61.23 Remove Cerumen     It was my pleasure seeing Stu Kee today in clinic.  The patient presents with nasal obstruction merrily due to external nasal valve collapse.  He does have some moderate inferior turbinate hypertrophy which I think is treating less so.  He is not doing well with topical nasal medicines.  He does have some improvement with using Vicks topically.  Ultimately, I think that he will require repositioning of the lower lateral cartilage to strengthen his external nasal valve.  I would recommend discussing this with a facial plastic surgeon.  I will place a referral to [facility].    I had a long jeffery discussion with the patient about not treating  his obstructive sleep apnea.  He had severe sleep apnea on his study.  He should be using CPAP therapy.  We discussed the risk of heart attack, stroke, pulmonary hypertension in patients with untreated severe obstructive sleep apnea.  I also discussed the patient that even after nasal surgery, he likely would still require CPAP.  The patient will follow up with sleep medicine to discuss CPAP therapy in the meantime.    Patient did have bilateral impacted cerumen removed today in office.  I also recommended that he start using his hearing aids that he has at home to help his tinnitus.  The patient can return to clinic as needed for hearing concerns.    Sergio Cavazos MD  Department of Otolarygology-Head and Neck Surgery  [facility]

## 2019-12-02 ENCOUNTER — OFFICE VISIT (OUTPATIENT)
Dept: OTOLARYNGOLOGY | Facility: CLINIC | Age: 64
End: 2019-12-02
Payer: COMMERCIAL

## 2019-12-02 VITALS
WEIGHT: 315 LBS | HEART RATE: 82 BPM | DIASTOLIC BLOOD PRESSURE: 81 MMHG | SYSTOLIC BLOOD PRESSURE: 130 MMHG | TEMPERATURE: 98.2 F | BODY MASS INDEX: 41.39 KG/M2

## 2019-12-02 DIAGNOSIS — H61.23 BILATERAL IMPACTED CERUMEN: ICD-10-CM

## 2019-12-02 DIAGNOSIS — M95.0 NASAL DEFORMITY: ICD-10-CM

## 2019-12-02 DIAGNOSIS — E66.01 MORBID OBESITY (H): ICD-10-CM

## 2019-12-02 DIAGNOSIS — H90.3 SENSORINEURAL HEARING LOSS, BILATERAL: ICD-10-CM

## 2019-12-02 DIAGNOSIS — G47.33 OBSTRUCTIVE SLEEP APNEA SYNDROME: ICD-10-CM

## 2019-12-02 DIAGNOSIS — H93.13 TINNITUS, BILATERAL: ICD-10-CM

## 2019-12-02 DIAGNOSIS — J34.89 NASAL OBSTRUCTION: Primary | ICD-10-CM

## 2019-12-02 DIAGNOSIS — J34.829 NASAL VALVE COLLAPSE: ICD-10-CM

## 2019-12-02 DIAGNOSIS — G47.33 SEVERE OBSTRUCTIVE SLEEP APNEA: ICD-10-CM

## 2019-12-02 PROCEDURE — 69210 REMOVE IMPACTED EAR WAX UNI: CPT | Performed by: OTOLARYNGOLOGY

## 2019-12-02 PROCEDURE — 99203 OFFICE O/P NEW LOW 30 MIN: CPT | Mod: 25 | Performed by: OTOLARYNGOLOGY

## 2019-12-02 ASSESSMENT — PAIN SCALES - GENERAL: PAINLEVEL: MILD PAIN (2)

## 2019-12-02 NOTE — LETTER
12/2/2019         RE: Stu Kee  [address] [facility] [address]        Dear Colleague,    Thank you for referring your patient, Stu Kee, to the [facility]. Please see a copy of my visit note below.    Chief Complaint   Patient presents with     Ent Problem     unable to breathe through his nose - did have sleep study done - saw ENT at [facility] was told to do nasal spray and medication which made his symptoms worse     History of Present Illness   Stu Kee is a 63 year old male who presents today for evaluation.  The patient describes symptoms of bilateral nasal obstruction since the patient was a teenager.  He denies any significant rhinorrhea, postnasal drainage, taste or smell disturbance, face pain/pressure/fullness.  He not had previous nose or sinus surgery.  He is tried topical nasal steroid and antihistamine sprays without benefit.  He tried oral dyspnea without benefit.  He tried Vicks in his nose which helped some.  He does struggle with some nasal dryness.  He does not have any nose or sinus imaging.     The patient does report bilateral hearing loss and bilateral tinnitus.  He is undergone previous audiometric assessment which does show normal sloping to moderately severe sensorineural hearing loss in both ears.  He does have hearing weight aids but does not wear them.  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  He denies any otalgia, otorrhea, bloody otorrhea, dizziness, or previous ear surgery.     Patient has severe obstructive sleep apnea with AHI of 35.8 events per hour.  He has not been fitted with CPAP.    Past Medical History  Patient Active Problem List   Diagnosis     CARDIOVASCULAR SCREENING; LDL GOAL LESS THAN 160     Advanced directives, counseling/discussion     Hypertension, goal below 140/90     Aortic valve replaced     Abscess of leg, left s/p I+D      Traumatic open wound of lower leg with  infection, left, subsequent encounter     Acute idiopathic gout of left foot     DIAGNOSIS NOT YET DEFINED     Venous (peripheral) insufficiency     Bleeding from varicose veins of lower extremity, right     Morbid obesity (H)     Current Medications     Current Outpatient Medications:      ASPIRIN PO, Take 81 mg by mouth daily , Disp: , Rfl:      carvedilol (COREG) 12.5 MG tablet, TAKE ONE TABLET BY MOUTH TWICE DAILY TAKE  WITH  6.25MG  TABS, Disp: 180 tablet, Rfl: 1     carvedilol (COREG) 6.25 MG tablet, TAKE ONE TABLET BY MOUTH TWICE DAILY WITH MEALS WITH  12.5  TAB, Disp: 180 tablet, Rfl: 1     chlorthalidone (HYGROTON) 25 MG tablet, TAKE ONE TABLET BY MOUTH ONCE DAILY, Disp: 90 tablet, Rfl: 1     lisinopril (PRINIVIL/ZESTRIL) 20 MG tablet, TAKE 1 TABLET BY MOUTH TWICE DAILY, Disp: 180 tablet, Rfl: 1     simvastatin (ZOCOR) 20 MG tablet, TAKE ONE TABLET BY MOUTH AT BEDTIME -NEEDS  FASTING  LIPIDS  FOR  FURTHER  REFILLS-, Disp: 90 tablet, Rfl: 2     allopurinol (ZYLOPRIM) 100 MG tablet, Take 1 tablet (100 mg) by mouth daily (Patient not taking: Reported on 12/2/2019), Disp: 90 tablet, Rfl: 1     Multiple Vitamins-Minerals (CENTRUM SILVER) per tablet, Take 1 tablet by mouth daily, Disp: , Rfl:     Allergies  Allergies   Allergen Reactions     Amlodipine Besylate Swelling       Social History   Social History     Socioeconomic History     Marital status:      Spouse name: Not on file     Number of children: Not on file     Years of education: Not on file     Highest education level: Not on file   Occupational History     Not on file   Social Needs     Financial resource strain: Not on file     Food insecurity:     Worry: Not on file     Inability: Not on file     Transportation needs:     Medical: Not on file     Non-medical: Not on file   Tobacco Use     Smoking status: Never Smoker     Smokeless tobacco: Never Used   Substance and Sexual Activity     Alcohol use: Yes     Comment: 1 drink/month     Drug  use: No     Sexual activity: Yes   Lifestyle     Physical activity:     Days per week: Not on file     Minutes per session: Not on file     Stress: Not on file   Relationships     Social connections:     Talks on phone: Not on file     Gets together: Not on file     Attends Spiritism service: Not on file     Active member of club or organization: Not on file     Attends meetings of clubs or organizations: Not on file     Relationship status: Not on file     Intimate partner violence:     Fear of current or ex partner: Not on file     Emotionally abused: Not on file     Physically abused: Not on file     Forced sexual activity: Not on file   Other Topics Concern     Parent/sibling w/ CABG, MI or angioplasty before 65F 55M? No   Social History Narrative     Not on file       Family History  Family History   Problem Relation Age of Onset     Heart Disease Mother      Heart Disease Father         valve replaced     Cancer Father      Cerebrovascular Disease Maternal Grandmother      Arrhythmia Maternal Grandmother         pacemaker     Arrhythmia Sister         pacemaker     Diabetes Sister      Hypertension Daughter      Heart Disease Other      Lung Cancer Sister        Review of Systems  As per HPI and PMHx, otherwise 10+ comprehensive system review is negative.    Physical Exam  /81 (BP Location: Right arm, Patient Position: Chair, Cuff Size: Adult Large)   Pulse 82   Temp 98.2  F (36.8  C)   Wt (!) 154.2 kg (340 lb)   BMI 41.39 kg/m     GENERAL: Patient is a pleasant, cooperative 63 year old male in no acute distress.  HEAD: Normocephalic, atraumatic.  Hair and scalp are normal.  EYES: Pupils are equal, round, reactive to light and accommodation.  Extraocular movements are intact.  The sclera nonicteric without injection.  The extraocular structures are normal.  EARS: See below.  NOSE: The patient has nasal skin.  His bony nasal dorsum is recently straight.  His middle third is very pinched.  He has  adequate projection at the tip and base.  The patient has significant sagittal malpositioning of his lower lateral crura with a ball-tipped appearance on his nose.  He has obvious external nasal valve collapse with gentle inhalation.  Modified caudal maneuver leads to significant improvement in nasal breathing bilaterally.  Nasal mucosa is slightly dry.  Nasal septum is essentially midline.  The inferior turbinates are moderately hypertrophied.  No nasal cavity masses, polyps, or mucopurulence on anterior rhinoscopy.  NEUROLOGIC: Cranial nerves II through XII are grossly intact.  Voice is strong.  Patient is House-Brackman I/VI bilaterally.  CARDIOVASCULAR: Extremities are warm and well-perfused.  No significant peripheral edema.  RESPIRATORY: Patient has nonlabored breathing without cough, wheeze, stridor.  PSYCHIATRIC: Patient is alert and oriented.  Mood and affect appear normal.  SKIN: Warm and dry.  No scalp, face, or neck lesions noted.    Assessment and Plan     ICD-10-CM    1. Nasal obstruction J34.89 OTOLARYNGOLOGY REFERRAL   2. 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 Nasal deformity M95.0 OTOLARYNGOLOGY REFERRAL   4. Obstructive sleep apnea syndrome G47.33 OTOLARYNGOLOGY REFERRAL   5. Sensorineural hearing loss, bilateral H90.3 OTOLARYNGOLOGY REFERRAL   6. Tinnitus, bilateral H93.13 OTOLARYNGOLOGY REFERRAL   7. Severe obstructive sleep apnea G47.33 OTOLARYNGOLOGY REFERRAL   8. Morbid obesity (H) E66.01 OTOLARYNGOLOGY REFERRAL   9. Bilateral impacted cerumen H61.23 Remove Cerumen     It was my pleasure seeing Stu Kee today in clinic.  The patient presents with nasal obstruction merrily due to external nasal valve collapse.  He does have some moderate inferior turbinate hypertrophy which I think is treating less so.  He is not doing well with topical nasal medicines.  He does have some improvement with using Vicks topically.  Ultimately, I think that he will require repositioning  of the lower lateral cartilage to strengthen his external nasal valve.  I would recommend discussing this with a facial plastic surgeon.  I will place a referral to [facility].    I had a long jeffery discussion with the patient about not treating his obstructive sleep apnea.  He had severe sleep apnea on his study.  He should be using CPAP therapy.  We discussed the risk of heart attack, stroke, pulmonary hypertension in patients with untreated severe obstructive sleep apnea.  I also discussed the patient that even after nasal surgery, he likely would still require CPAP.  The patient will follow up with sleep medicine to discuss CPAP therapy in the meantime.    Patient did have bilateral impacted cerumen removed today in office.  I also recommended that he start using his hearing aids that he has at home to help his tinnitus.  The patient can return to clinic as needed for hearing concerns.    Sergio Cavazos MD  Department of Otolarygology-Head and Neck Surgery  [facility]      Again, thank you for allowing me to participate in the care of your patient.        Sincerely,        Sergio Cavazos MD

## 2019-12-02 NOTE — NURSING NOTE
"Initial /81 (BP Location: Right arm, Patient Position: Chair, Cuff Size: Adult Large)   Pulse 82   Temp 98.2  F (36.8  C)   Wt (!) 154.2 kg (340 lb)   BMI 41.39 kg/m   Estimated body mass index is 41.39 kg/m  as calculated from the following:    Height as of 9/5/18: 1.93 m (6' 4\").    Weight as of this encounter: 154.2 kg (340 lb). .    Navya Melgar LPN    "

## 2019-12-02 NOTE — NURSING NOTE
This laryngoscopy scope was used on this patient.    Flexible    Olympus Flexible #[id number] Adult

## 2019-12-03 ENCOUNTER — TELEPHONE (OUTPATIENT)
Dept: OTOLARYNGOLOGY | Facility: CLINIC | Age: 64
End: 2019-12-03

## 2019-12-03 NOTE — TELEPHONE ENCOUNTER
Per Dr. Cavazos, pt and wife should receive the letter and records from [facility] where the study was completed.  LM notifying pt/wife.    Mary Grace MAYES CMA

## 2019-12-03 NOTE — TELEPHONE ENCOUNTER
FUTURE VISIT INFORMATION      FUTURE VISIT INFORMATION:    Date: 12/11/19    Time: 8AM    [place] [facility]  REFERRAL INFORMATION:    Referring provider:  Sergio Cavazos MD    Referring providers clinic:  [facility]    Reason for visit/diagnosis  Nasal Valve Repair Surgical Consult    RECORDS REQUESTED FROM:       Clinic name Comments Records Status Imaging Status    [facility] 12/2/19 notes with Dr Dirk HADDAD    [facility] 9/5/18 notes with Radha HADDAD

## 2019-12-03 NOTE — TELEPHONE ENCOUNTER
Reason for Call:  Other letter    Detailed comments: pt wife calling stating pt was seen yesterday. Needs letter stating pt was seen and dr went over sleep study results and it is recommended that he get a CPAP machine. [facility] is needing this for ins purposes because its been over 6 months since his sleep study. Please fax letter to [phone]    Phone Number Patient can be reached at: Home number on file [phone] (home)    Best Time: any     Can we leave a detailed message on this number? YES    Call taken on 12/3/2019 at 2:07 PM by Emely Barry

## 2019-12-04 NOTE — TELEPHONE ENCOUNTER
"Pt's wife Sharon calling back.  Wants to speak directly with Mary Grace.  \"I've already given then message to someone like you and it got all messed up, too many people involved\"  Requesting Mary Grace MAYES to call her back so she can explain why she needs this letter from Dr. Cavazos specifically.    Please call [phone]    Thanks-    -Damari Weiss  Clinic Station    "

## 2019-12-11 ENCOUNTER — PRE VISIT (OUTPATIENT)
Dept: OTOLARYNGOLOGY | Facility: CLINIC | Age: 64
End: 2019-12-11

## 2019-12-11 ENCOUNTER — OFFICE VISIT (OUTPATIENT)
Dept: OTOLARYNGOLOGY | Facility: CLINIC | Age: 64
End: 2019-12-11
Payer: COMMERCIAL

## 2019-12-11 ENCOUNTER — TELEPHONE (OUTPATIENT)
Dept: OTOLARYNGOLOGY | Facility: CLINIC | Age: 64
End: 2019-12-11

## 2019-12-11 VITALS
HEART RATE: 64 BPM | SYSTOLIC BLOOD PRESSURE: 152 MMHG | DIASTOLIC BLOOD PRESSURE: 90 MMHG | BODY MASS INDEX: 41.19 KG/M2 | WEIGHT: 315 LBS

## 2019-12-11 DIAGNOSIS — J34.89 NASAL OBSTRUCTION: Primary | ICD-10-CM

## 2019-12-11 RX ORDER — FLUTICASONE PROPIONATE 50 MCG
2 SPRAY, SUSPENSION (ML) NASAL DAILY
Qty: 9.9 ML | Refills: 6 | Status: SHIPPED | OUTPATIENT
Start: 2019-12-11 | End: 2020-01-10

## 2019-12-11 ASSESSMENT — PAIN SCALES - GENERAL: PAINLEVEL: NO PAIN (0)

## 2019-12-11 NOTE — TELEPHONE ENCOUNTER
Informed pharmacist that Dr. Prescott was ok with switching the Flonase to the prescription strength.    Hiral Matthews, EMT

## 2019-12-11 NOTE — TELEPHONE ENCOUNTER
[facility] Call Center    Phone Message    May a detailed message be left on voicemail: yes    Reason for Call: Other: Other: Pharmacy called in and wanted to know if they can switch this medication to the perscription strength because the over the counter strength insurance does not cover. Please follow up with the pharmacy and let them know if this is ok to do. Thank you.      Action Taken: Message routed to:  [facility] ([facility]): ent

## 2019-12-11 NOTE — LETTER
12/11/2019       RE: Stu Kee  [address] [facility] [address]     Dear Colleague,    Thank you for referring your patient, Stu Kee, to the [facility] EAR NO[facility] AND THROAT at [facility]. Please see a copy of my visit note below.    HISTORY OF PRESENT ILLNESS:  Stu Kee is a 64-year-old gentleman who is referred for specifically consideration of surgery for nasal valve collapse.  He has tried over-the-counter sprays which have not helped including Afrin.  He states that he uses them for about a week and they subside in their usefulness.  At the same time, he has used Breathe Right strips which have helped but again he has short-term effects that wane.      PAST MEDICAL HISTORY:  Reviewed.      MEDICATIONS:  Reviewed.      ALLERGIES:  REVIEWED.      REVIEW OF SYSTEMS:  He has a sleep apnea examination coming up and he would like to use nasal prongs, which is the reason he is here to help with his nasal breathing.  He denies any nasal trauma in the past, specifically a nasal fracture.      REVIEW OF SYSTEMS:  Notable for alcohol use.  He is a nonsmoker.      PHYSICAL EXAMINATION:  The examination shows tympanic membranes intact and mobile.  Nares are obstructed with a slightly deviated septum to the right, but bilateral Grady maneuvers help breathing immensely.  At the same time, he does have mild mucus formation for which he was using nasal sprays in the past.  The remainder of the head and neck examination is unremarkable.      ASSESSMENT:  He exhibit nasal valve collapse and also slightly deviated septum.  He does have chronic rhinitis.      PLAN:  Recommend using Flonase for at least a month trial.  He will call back and let us know if indeed he would want to pursue surgery which he will return at that time for scheduling.           Again, thank you for allowing me to participate in the care of your patient.      Sincerely,    Barrie Prescott,  MD

## 2019-12-11 NOTE — PATIENT INSTRUCTIONS
1. You were seen in the ENT Clinic today by .  If you have any questions or concerns after your appointment, please call   - Option 1: ENT Clinic: [phone]  - Option 2: Monica ('s Nurse): [phone]    2.   Plan to return to clinic as needed.    JOSEE Anderson  [facility]- Otolaryngology  [phone]

## 2019-12-11 NOTE — PROGRESS NOTES
HISTORY OF PRESENT ILLNESS:  Stu Kee is a 64-year-old gentleman who is referred for specifically consideration of surgery for nasal valve collapse.  He has tried over-the-counter sprays which have not helped including Afrin.  He states that he uses them for about a week and they subside in their usefulness.  At the same time, he has used Breathe Right strips which have helped but again he has short-term effects that wane.      PAST MEDICAL HISTORY:  Reviewed.      MEDICATIONS:  Reviewed.      ALLERGIES:  REVIEWED.      REVIEW OF SYSTEMS:  He has a sleep apnea examination coming up and he would like to use nasal prongs, which is the reason he is here to help with his nasal breathing.  He denies any nasal trauma in the past, specifically a nasal fracture.      REVIEW OF SYSTEMS:  Notable for alcohol use.  He is a nonsmoker.      PHYSICAL EXAMINATION:  The examination shows tympanic membranes intact and mobile.  Nares are obstructed with a slightly deviated septum to the right, but bilateral Grady maneuvers help breathing immensely.  At the same time, he does have mild mucus formation for which he was using nasal sprays in the past.  The remainder of the head and neck examination is unremarkable.      ASSESSMENT:  He exhibit nasal valve collapse and also slightly deviated septum.  He does have chronic rhinitis.      PLAN:  Recommend using Flonase for at least a month trial.  He will call back and let us know if indeed he would want to pursue surgery which he will return at that time for scheduling.

## 2019-12-11 NOTE — NURSING NOTE
Chief Complaint   Patient presents with     Consult     nasal valve repair surgical consult     Chelsea Anderson LPN

## 2020-01-24 ENCOUNTER — ALLIED HEALTH/NURSE VISIT (OUTPATIENT)
Dept: EDUCATION SERVICES | Facility: CLINIC | Age: 65
End: 2020-01-24
Payer: COMMERCIAL

## 2020-01-24 VITALS — BODY MASS INDEX: 41.31 KG/M2 | WEIGHT: 315 LBS

## 2020-01-24 DIAGNOSIS — E11.9 DIABETES MELLITUS, TYPE 2 (H): Primary | ICD-10-CM

## 2020-01-24 PROCEDURE — G0108 DIAB MANAGE TRN  PER INDIV: HCPCS | Performed by: DIETITIAN, REGISTERED

## 2020-01-24 NOTE — PROGRESS NOTES
"Diabetes Self-Management Education & Support    Diabetes Education Self Management & Training    SUBJECTIVE/OBJECTIVE:  Presents for: Individual review  Accompanied by: Self  Diabetes education in the past 24mo: No  Focus of Visit: Patient Unsure, Healthy Eating, Being Active  Diabetes type: Type 2  How confident are you filling out medical forms by yourself:: Not Assessed  Transportation concerns: No  Other concerns:: None  Cultural Influences/Ethnic Background:  American      Diabetes Symptoms & Complications          Patient Problem List and Family Medical History reviewed for relevant medical history, current medical status, and diabetes risk factors.    Vitals:  Wt (!) 154 kg (339 lb 6.4 oz)   BMI 41.31 kg/m    Estimated body mass index is 41.31 kg/m  as calculated from the following:    Height as of 9/5/18: 1.93 m (6' 4\").    Weight as of this encounter: 154 kg (339 lb 6.4 oz).   Last 3 BP:   BP Readings from Last 3 Encounters:   12/11/19 (!) 152/90   12/02/19 130/81   09/05/18 138/76       History   Smoking Status     Never Smoker   Smokeless Tobacco     Never Used       Labs:  No results found for: A1C  Lab Results   Component Value Date     02/20/2018     Lab Results   Component Value Date    LDL 61 02/20/2018     HDL Cholesterol   Date Value Ref Range Status   02/20/2018 48 >39 mg/dL Final   ]  GFR Estimate   Date Value Ref Range Status   02/20/2018 79 >60 mL/min/1.7m2 Final     Comment:     Non  GFR Calc     GFR Estimate If Black   Date Value Ref Range Status   02/20/2018 >90 >60 mL/min/1.7m2 Final     Comment:      GFR Calc     Lab Results   Component Value Date    CR 0.96 02/20/2018     No results found for: MICROALBUMIN    Healthy Eating  Breakfast: usually 1 banana, pills- metformin, two packets of yogurt and beef and cheese packet or crossaint with that (occasionally 1 a week)  Lunch: usually larger meal of day: hu chicken salad with grapes, pickles or pork " roast or chop, small portion of potato, corn or other veggie, water or diet coke  Dinner: cereal 2-3 times a week, light meal: peanut sandwich, meat and cheese sandwich  Snacks: mini donuts occasionally-tries to stay to 2-3 no more at  time, carrots  Other: goals:   Beverages: Water    Being Active  Being Active Assessed Today: Yes  Exercise:: (not currently exercising, does have a stationary bike)    Monitoring      Breakfast units Lunch units Supper  units    31-Dec 186         3-Pasquale 186         4-Pasquale 186         7-Pasquale 171         13-Jan 152         19-Jan 144         22-Jan 165         24-Jan 159          #DIV/0! #DIV/0! #DIV/0! #DIV/0! #DIV/0! #DIV/0!       Taking Medications  Metformin, recently increased.  Is seen in another care system so he will get all increases from them         Problem Solving     Not assessed            Reducing Risks       Healthy Coping     Patient Activation Measure Survey Score:  GERSON Score (Last Two) 8/13/2012   GERSON Raw Score 40   Activation Score 60   GERSON Level 3       ASSESSMENT:  Is trying to keep his carbs restricted to aid in weight loss and to help his diabetes.  HE will follow plan set up by dietitians at wt loss center.    He needs to work on activity, he is working on biking on stationary bike during the new three times a week    Went over pathophysiology of diabetes        Patient's most recent No results found for: A1C is not meeting goal of <7.0    INTERVENTION:   Diabetes knowledge and skills assessment:     Patient is knowledgeable in diabetes management concepts related to: Healthy Eating, Being Active, Monitoring and Taking Medication    Patient needs further education on the following diabetes management concepts: Healthy Eating, Being Active, Monitoring, Taking Medication, Problem Solving, Reducing Risks and Healthy Coping    Based on learning assessment above, most appropriate setting for further diabetes education would be: Individual  setting.    Education provided today on:  AADE Self-Care Behaviors:  Diabetes Pathophysiology  Healthy Eating: carbohydrate counting, consistency in amount, composition, and timing of food intake and label reading  Being Active: relationship to blood glucose and describe appropriate activity program  Monitoring: individual blood glucose targets and frequency of monitoring  Taking Medication: action of prescribed medication and when to take medications    Opportunities for ongoing education and support in diabetes-self management were discussed.    Pt verbalized understanding of concepts discussed and recommendations provided today.       Education Materials Provided:  No new materials provided today    PLAN:  See Patient Instructions for co-developed, patient-stated behavior change goals.  AVS printed and provided to patient today. See Follow-Up section for recommended follow-up.      Time Spent: 60 minutes  Encounter Type: Individual    Any diabetes medication dose changes were made via the CDE Protocol and Collaborative Practice Agreement with the patient's primary care provider. A copy of this encounter was shared with the provider.

## 2020-01-24 NOTE — PATIENT INSTRUCTIONS
1.  Keep your carbohydrates at the amount recommended by the wt loss center.  This will help the weight loss and the blood sugars.  -chew foods to applesauce consistency  -work on drinking liquids between your meals  -try to eat slowly    2.  Eat more raw veggies, look into getting the containers already put together to have at the shop to snack on.  Try to eat with out the dip.  Raw veggies better than nuts lower in calories.    3.  While you watch the news ride the exercise bike.  At least three days a week.    4.  Try to avoid cereal at supper and see if you get a better blood sugar with the sandwich.      5.  Goal for blood sugar is  premeal, usually if your blood sugars are running there your A1C would be under 7%.

## 2020-03-20 ENCOUNTER — ALLIED HEALTH/NURSE VISIT (OUTPATIENT)
Dept: EDUCATION SERVICES | Facility: CLINIC | Age: 65
End: 2020-03-20
Payer: COMMERCIAL

## 2020-03-20 DIAGNOSIS — E11.9 DIABETES MELLITUS (H): Primary | ICD-10-CM

## 2020-03-20 NOTE — PROGRESS NOTES
"Diabetes Self-Management Education & Support    Presents for:      SUBJECTIVE/OBJECTIVE:     Cultural Influences/Ethnic Background:  American      Diabetes Symptoms & Complications:          Patient Problem List and Family Medical History reviewed for relevant medical history, current medical status, and diabetes risk factors.    Vitals:  There were no vitals taken for this visit.  Estimated body mass index is 41.31 kg/m  as calculated from the following:    Height as of 9/5/18: 1.93 m (6' 4\").    Weight as of 1/24/20: 154 kg (339 lb 6.4 oz).   Last 3 BP:   BP Readings from Last 3 Encounters:   12/11/19 (!) 152/90   12/02/19 130/81   09/05/18 138/76       History   Smoking Status     Never Smoker   Smokeless Tobacco     Never Used       Labs:  No results found for: A1C  Lab Results   Component Value Date     02/20/2018     Lab Results   Component Value Date    LDL 61 02/20/2018     HDL Cholesterol   Date Value Ref Range Status   02/20/2018 48 >39 mg/dL Final   ]  GFR Estimate   Date Value Ref Range Status   02/20/2018 79 >60 mL/min/1.7m2 Final     Comment:     Non  GFR Calc     GFR Estimate If Black   Date Value Ref Range Status   02/20/2018 >90 >60 mL/min/1.7m2 Final     Comment:      GFR Calc     Lab Results   Component Value Date    CR 0.96 02/20/2018     No results found for: MICROALBUMIN    Healthy Eating:  Breakfast: two yogurts, orange and banana    Has: roll waiting for him  Lunch: chicken, vegetables  Dinner: nachos, peanut butter sandwich  Snacks: carrots, oranges  Beverages: Water  Has patient met with a dietitian in the past?: Yes    Being Active:  Being Active Assessed Today: Yes  Exercise:: Yes(not currently exercising, does have a stationary bike)  Days per week of moderate to strenuous exercise (like a brisk walk): 5  On average, minutes per day of exercise at this level: 30  Exercise Minutes per Week: 150    Monitoring:   blood sugars: 137 last time he " checked    Taking Medications:           Problem Solving:   not assessed              Reducing Risks:   not assessed    Healthy Coping:     Patient Activation Measure Survey Score:  GERSON Score (Last Two) 8/13/2012   GERSON Raw Score 40   Activation Score 60   GERSON Level 3       Diabetes knowledge and skills assessment:   Patient is knowledgeable in diabetes management concepts related to: Healthy Eating, Being Active and Monitoring    Patient needs further education on the following diabetes management concepts: Healthy Eating, Being Active, Monitoring, Taking Medication, Problem Solving, Reducing Risks and Healthy Coping    Based on learning assessment above, most appropriate setting for further diabetes education would be: Individual setting.      INTERVENTIONS:    Education provided today on:  AADE Self-Care Behaviors:  Healthy Eating: carbohydrate counting, consistency in amount, composition, and timing of food intake and portion control. Doing losts of fruits and vegetables  Being Active: relationship to blood glucose and describe appropriate activity program  Taking Medication: action of prescribed medication and when to take medications    Opportunities for ongoing education and support in diabetes-self management were discussed.    Pt verbalized understanding of concepts discussed and recommendations provided today.       Education Materials Provided:  No new materials provided today      ASSESSMENT:  Pt doing well, he is still watching food choices and portions. He is getting on the exercise bike daily for 30 minutes in the morning.  He is losing weight.  His surgery was moved to fall.    PLAN  See Patient Instructions for co-developed, patient-stated behavior change goals.  AVS printed and provided to patient today. See Follow-Up section for recommended follow-up.      Time Spent: 18 minutes  Encounter Type: Individual    Any diabetes medication dose changes were made via the CDE Protocol and Collaborative  Practice Agreement with the patient's primary care provider. A copy of this encounter was shared with the provider.

## 2020-06-04 ENCOUNTER — ALLIED HEALTH/NURSE VISIT (OUTPATIENT)
Dept: EDUCATION SERVICES | Facility: CLINIC | Age: 65
End: 2020-06-04
Payer: COMMERCIAL

## 2020-06-04 DIAGNOSIS — E11.9 DIABETES MELLITUS, TYPE 2 (H): Primary | ICD-10-CM

## 2020-06-04 PROCEDURE — 98967 PH1 ASSMT&MGMT NQHP 11-20: CPT | Mod: TEL | Performed by: DIETITIAN, REGISTERED

## 2020-06-04 NOTE — PROGRESS NOTES
"Diabetes Self-Management Education & Support working towards weight loss for bariatric surgery    Patient verbally consented to the telephone visit service today: yes      SUBJECTIVE/OBJECTIVE:  Presents for: Individual review on phone  Accompanied by: Self  Diabetes education in the past 24mo: No  Diabetes type: Type 2  Cultural Influences/Ethnic Background:  American      Diabetes Symptoms & Complications:          Patient Problem List and Family Medical History reviewed for relevant medical history, current medical status, and diabetes risk factors.    Vitals:  There were no vitals taken for this visit.  Estimated body mass index is 41.31 kg/m  as calculated from the following:    Height as of 9/5/18: 1.93 m (6' 4\").    Weight as of 1/24/20: 154 kg (339 lb 6.4 oz).   Last 3 BP:   BP Readings from Last 3 Encounters:   12/11/19 (!) 152/90   12/02/19 130/81   09/05/18 138/76       History   Smoking Status     Never Smoker   Smokeless Tobacco     Never Used       Labs:  No results found for: A1C  Lab Results   Component Value Date     02/20/2018     Lab Results   Component Value Date    LDL 61 02/20/2018     HDL Cholesterol   Date Value Ref Range Status   02/20/2018 48 >39 mg/dL Final   ]  GFR Estimate   Date Value Ref Range Status   02/20/2018 79 >60 mL/min/1.7m2 Final     Comment:     Non  GFR Calc     GFR Estimate If Black   Date Value Ref Range Status   02/20/2018 >90 >60 mL/min/1.7m2 Final     Comment:      GFR Calc     Lab Results   Component Value Date    CR 0.96 02/20/2018     No results found for: MICROALBUMIN    Healthy Eating:  Healthy Eating Assessed Today: Yes  Breakfast: yogurt 1-2 and fruit, pc of bread with peanut butter  Lunch: taco salad, sour cream and hot sauce  Dinner: BLT  Other: blood sugar this am 144,    319 currently  Beverages: Water  Has patient met with a dietitian in the past?: Yes    Being Active:  Being Active Assessed Today: Yes  Exercise:: (not " happening since he moved.  he realizes he needs to get back on the bike)  Gardening.        Monitorin this morning, numbers have been ranging 138 average.      Taking Medications:  not assessed         Problem Solving:   not assessed              Reducing Risks:   not assessed  Healthy Coping:     Patient Activation Measure Survey Score:  GERSON Score (Last Two) 2012   GERSON Raw Score 40   Activation Score 60   GERSON Level 3       Diabetes knowledge and skills assessment:   Patient is knowledgeable in diabetes management concepts related to: Healthy Eating, Being Active and Monitoring    Patient needs further education on the following diabetes management concepts: Healthy Eating, Being Active, Monitoring, Taking Medication, Problem Solving, Reducing Risks and Healthy Coping    Based on learning assessment above, most appropriate setting for further diabetes education would be: Individual setting.      INTERVENTIONS:    Education provided today on:  AADE Self-Care Behaviors:  Healthy Eating: weight reduction and portion control  Being Active: relationship to blood glucose and describe appropriate activity program  Monitoring: individual blood glucose targets    Opportunities for ongoing education and support in diabetes-self management were discussed.    Pt verbalized understanding of concepts discussed and recommendations provided today.       Education Materials Provided:  No new materials provided today      ASSESSMENT:  Doing ok, has been struggling with activity due to moving shop recently.  He is planning to work hard on adding in activity again and watching all food choices wants to lose 7 lbs by end of month.  He is on track for her wt loss surgery, he has been working hard at not drinking until 45 minutes after eating.        Patient's most recent No results found for: A1C   PLAN  See Patient Instructions for co-developed, patient-stated behavior change goals.  AVS printed and provided to patient  today. See Follow-Up section for recommended follow-up.      Time Spent: 12 minutes  Encounter Type: Individual    Any diabetes medication dose changes were made via the CDE Protocol and Collaborative Practice Agreement with the patient's primary care provider. A copy of this encounter was shared with the provider.

## 2023-01-01 ENCOUNTER — HOSPITAL ENCOUNTER (EMERGENCY)
Facility: CLINIC | Age: 68
Discharge: HOME | End: 2023-01-13
Attending: EMERGENCY MEDICINE | Admitting: FAMILY MEDICINE
Payer: COMMERCIAL

## 2023-01-01 ENCOUNTER — APPOINTMENT (OUTPATIENT)
Dept: GENERAL RADIOLOGY | Facility: CLINIC | Age: 68
End: 2023-01-01
Attending: FAMILY MEDICINE
Payer: COMMERCIAL

## 2023-01-01 VITALS
SYSTOLIC BLOOD PRESSURE: 129 MMHG | RESPIRATION RATE: 17 BRPM | HEIGHT: 76 IN | BODY MASS INDEX: 38.36 KG/M2 | WEIGHT: 315 LBS | HEART RATE: 78 BPM | DIASTOLIC BLOOD PRESSURE: 97 MMHG | OXYGEN SATURATION: 96 % | TEMPERATURE: 98.2 F

## 2023-01-01 DIAGNOSIS — I50.9 ACUTE ON CHRONIC CONGESTIVE HEART FAILURE, UNSPECIFIED HEART FAILURE TYPE (H): ICD-10-CM

## 2023-01-01 DIAGNOSIS — I48.91 ATRIAL FIBRILLATION, UNSPECIFIED TYPE (H): ICD-10-CM

## 2023-01-01 LAB
ALBUMIN SERPL BCG-MCNC: 3.5 G/DL (ref 3.5–5.2)
ALBUMIN UR-MCNC: NEGATIVE MG/DL
ALP SERPL-CCNC: 55 U/L (ref 40–129)
ALT SERPL W P-5'-P-CCNC: 17 U/L (ref 10–50)
ANION GAP SERPL CALCULATED.3IONS-SCNC: 10 MMOL/L (ref 7–15)
APPEARANCE UR: CLEAR
AST SERPL W P-5'-P-CCNC: 22 U/L (ref 10–50)
BASOPHILS # BLD AUTO: 0.1 10E3/UL (ref 0–0.2)
BASOPHILS NFR BLD AUTO: 1 %
BILIRUB SERPL-MCNC: 0.9 MG/DL
BILIRUB UR QL STRIP: NEGATIVE
BUN SERPL-MCNC: 17.9 MG/DL (ref 8–23)
CALCIUM SERPL-MCNC: 9.2 MG/DL (ref 8.8–10.2)
CHLORIDE SERPL-SCNC: 104 MMOL/L (ref 98–107)
COLOR UR AUTO: YELLOW
CREAT SERPL-MCNC: 1.01 MG/DL (ref 0.67–1.17)
DEPRECATED HCO3 PLAS-SCNC: 28 MMOL/L (ref 22–29)
EOSINOPHIL # BLD AUTO: 0.2 10E3/UL (ref 0–0.7)
EOSINOPHIL NFR BLD AUTO: 2 %
ERYTHROCYTE [DISTWIDTH] IN BLOOD BY AUTOMATED COUNT: 15.1 % (ref 10–15)
GFR SERPL CREATININE-BSD FRML MDRD: 81 ML/MIN/1.73M2
GLUCOSE SERPL-MCNC: 137 MG/DL (ref 70–99)
GLUCOSE UR STRIP-MCNC: NEGATIVE MG/DL
HCT VFR BLD AUTO: 39.9 % (ref 40–53)
HGB BLD-MCNC: 13 G/DL (ref 13.3–17.7)
HGB UR QL STRIP: ABNORMAL
IMM GRANULOCYTES # BLD: 0.1 10E3/UL
IMM GRANULOCYTES NFR BLD: 1 %
KETONES UR STRIP-MCNC: NEGATIVE MG/DL
LEUKOCYTE ESTERASE UR QL STRIP: ABNORMAL
LYMPHOCYTES # BLD AUTO: 1.5 10E3/UL (ref 0.8–5.3)
LYMPHOCYTES NFR BLD AUTO: 18 %
MCH RBC QN AUTO: 28.9 PG (ref 26.5–33)
MCHC RBC AUTO-ENTMCNC: 32.6 G/DL (ref 31.5–36.5)
MCV RBC AUTO: 89 FL (ref 78–100)
MONOCYTES # BLD AUTO: 0.6 10E3/UL (ref 0–1.3)
MONOCYTES NFR BLD AUTO: 7 %
MUCOUS THREADS #/AREA URNS LPF: PRESENT /LPF
NEUTROPHILS # BLD AUTO: 6 10E3/UL (ref 1.6–8.3)
NEUTROPHILS NFR BLD AUTO: 71 %
NITRATE UR QL: NEGATIVE
NRBC # BLD AUTO: 0 10E3/UL
NRBC BLD AUTO-RTO: 0 /100
NT-PROBNP SERPL-MCNC: 4685 PG/ML (ref 0–900)
PH UR STRIP: 6.5 [PH] (ref 5–7)
PLATELET # BLD AUTO: 134 10E3/UL (ref 150–450)
POTASSIUM SERPL-SCNC: 4 MMOL/L (ref 3.4–5.3)
PROT SERPL-MCNC: 6.5 G/DL (ref 6.4–8.3)
RBC # BLD AUTO: 4.5 10E6/UL (ref 4.4–5.9)
RBC URINE: <1 /HPF
SODIUM SERPL-SCNC: 142 MMOL/L (ref 136–145)
SP GR UR STRIP: 1.01 (ref 1–1.03)
UROBILINOGEN UR STRIP-MCNC: NORMAL MG/DL
WBC # BLD AUTO: 8.3 10E3/UL (ref 4–11)
WBC URINE: 3 /HPF

## 2023-01-01 PROCEDURE — 80053 COMPREHEN METABOLIC PANEL: CPT | Performed by: EMERGENCY MEDICINE

## 2023-01-01 PROCEDURE — 71046 X-RAY EXAM CHEST 2 VIEWS: CPT

## 2023-01-01 PROCEDURE — 81001 URINALYSIS AUTO W/SCOPE: CPT | Performed by: EMERGENCY MEDICINE

## 2023-01-01 PROCEDURE — 85025 COMPLETE CBC W/AUTO DIFF WBC: CPT | Performed by: EMERGENCY MEDICINE

## 2023-01-01 PROCEDURE — 36415 COLL VENOUS BLD VENIPUNCTURE: CPT | Performed by: EMERGENCY MEDICINE

## 2023-01-01 PROCEDURE — 99284 EMERGENCY DEPT VISIT MOD MDM: CPT | Mod: 25 | Performed by: FAMILY MEDICINE

## 2023-01-01 PROCEDURE — 99285 EMERGENCY DEPT VISIT HI MDM: CPT | Mod: 25 | Performed by: FAMILY MEDICINE

## 2023-01-01 PROCEDURE — 96374 THER/PROPH/DIAG INJ IV PUSH: CPT | Performed by: FAMILY MEDICINE

## 2023-01-01 PROCEDURE — 93005 ELECTROCARDIOGRAM TRACING: CPT | Performed by: FAMILY MEDICINE

## 2023-01-01 PROCEDURE — 83880 ASSAY OF NATRIURETIC PEPTIDE: CPT | Performed by: EMERGENCY MEDICINE

## 2023-01-01 PROCEDURE — 250N000013 HC RX MED GY IP 250 OP 250 PS 637: Performed by: FAMILY MEDICINE

## 2023-01-01 PROCEDURE — 93010 ELECTROCARDIOGRAM REPORT: CPT | Performed by: FAMILY MEDICINE

## 2023-01-01 PROCEDURE — 250N000011 HC RX IP 250 OP 636: Performed by: FAMILY MEDICINE

## 2023-01-01 RX ORDER — FUROSEMIDE 10 MG/ML
80 INJECTION INTRAMUSCULAR; INTRAVENOUS ONCE
Status: COMPLETED | OUTPATIENT
Start: 2023-01-01 | End: 2023-01-01

## 2023-01-01 RX ADMIN — NITROGLYCERIN 15 MG: 20 OINTMENT TOPICAL at 10:57

## 2023-01-01 RX ADMIN — FUROSEMIDE 80 MG: 10 INJECTION, SOLUTION INTRAMUSCULAR; INTRAVENOUS at 10:59

## 2023-01-01 ASSESSMENT — ACTIVITIES OF DAILY LIVING (ADL)
ADLS_ACUITY_SCORE: 35
ADLS_ACUITY_SCORE: 35

## 2023-01-13 NOTE — DISCHARGE INSTRUCTIONS
Lasix 40 mg p.o. twice daily as we discussed.  Compression stockings should be worn during the day consistently and can be removed at night with leg elevation at night.  Recheck with your primary care provider in 7 to 10 days in clinic and also with a repeat basic metabolic profile.

## 2023-01-13 NOTE — ED PROVIDER NOTES
History     Chief Complaint   Patient presents with     Shortness of Breath     HPI  Stu Kee is a 67 year old male, past medical history is significant for morbid obesity, peripheral venous insufficiency, bleeding varicosities, aortic valve replacement, hypertension, presents to the emergency department with concerns of shortness of breath.  History is obtained from the patient and his wife.  The patient was apparently here for visit with Dr. Thomas in ophthalmology and when he discussed being short of breath was directed to the emergency department.  Per the patient and his wife he has been short of breath since November when he had COVID.  He was apparently hospitalized at [facility] for 3 days with this.  He feels like he has been short of air since that time.  He has documented rate controlled atrial fibrillation.  He has noticed that over the past couple of weeks at least perhaps much longer that he is much more short of breath on exertion.  His primary has instructed him on Lasix 40 mg once a day.  Sometimes he takes that dose, sometimes he does not take any.  His primary wanted him to work up to 80 mg once daily.  He also uses compression stockings on for 3 days and off for a couple of days by his account.  He just took them off this morning.  He notes baseline swelling bilateral lower extremities.  Has not weighed himself on a regular basis but thinks he is probably gained weight.  He has noted orthopnea, prefers to sleep with a couple of pillows and they do have an inclined bed.  He identified occasional PND as well.  He denies any chest pain, chest tightness nausea or vomiting.  No heartburn.  He denies any recent URI type illness, no recurrence of COVID type symptoms by his account.      Allergies:  Allergies   Allergen Reactions     Amlodipine Besylate Swelling       Problem List:    Patient Active Problem List    Diagnosis Date Noted     Morbid obesity (H) 12/02/2019     Priority: Medium      Venous (peripheral) insufficiency 11/14/2017     Priority: Medium     Bleeding from varicose veins of lower extremity, right 11/14/2017     Priority: Medium     DIAGNOSIS NOT YET DEFINED 10/11/2017     Priority: Medium     Acute idiopathic gout of left foot 12/21/2016     Priority: Medium     Abscess of leg, left s/p I+D  08/26/2016     Priority: Medium     Traumatic open wound of lower leg with infection, left, subsequent encounter 08/26/2016     Priority: Medium     Aortic valve replaced 08/22/2016     Priority: Medium     bioprosthetic aortic valve replacement        Hypertension, goal below 140/90 12/02/2012     Priority: Medium     Advanced directives, counseling/discussion 08/13/2012     Priority: Medium     Discussed advance care planning with patient; information given to patient to review. 8/13/2012        CARDIOVASCULAR SCREENING; LDL GOAL LESS THAN 160 10/31/2010     Priority: Medium        Past Medical History:    No past medical history on file.    Past Surgical History:    Past Surgical History:   Procedure Laterality Date     APPENDECTOMY       ARTHROSCOPY KNEE RT/LT  age 36    Torn Men Left     LASIK         Family History:    Family History   Problem Relation Age of Onset     Heart Disease Mother      Heart Disease Father         valve replaced     Cancer Father      Cerebrovascular Disease Maternal Grandmother      Arrhythmia Maternal Grandmother         pacemaker     Arrhythmia Sister         pacemaker     Diabetes Sister      Hypertension Daughter      Heart Disease Other      Lung Cancer Sister        Social History:  Marital Status:   [2]  Social History     Tobacco Use     Smoking status: Never     Smokeless tobacco: Never   Substance Use Topics     Alcohol use: Yes     Comment: 1 drink/month     Drug use: No        Medications:    allopurinol (ZYLOPRIM) 100 MG tablet  ASPIRIN PO  carvedilol (COREG) 12.5 MG tablet  carvedilol (COREG) 6.25 MG tablet  chlorthalidone (HYGROTON) 25 MG  "tablet  lisinopril (PRINIVIL/ZESTRIL) 20 MG tablet  Multiple Vitamins-Minerals (CENTRUM SILVER) per tablet  simvastatin (ZOCOR) 20 MG tablet          Review of Systems   All other systems reviewed and are negative.      Physical Exam   BP: 118/87  Pulse: 74  Temp: 98.2  F (36.8  C)  Resp: 18  Height: 193 cm (6' 4\")  Weight: 149.7 kg (330 lb)  SpO2: 94 %      Physical Exam  Vitals and nursing note reviewed.   Constitutional:       General: He is not in acute distress.     Appearance: He is well-developed and normal weight. He is not ill-appearing.   HENT:      Head: Normocephalic and atraumatic.      Mouth/Throat:      Mouth: Mucous membranes are moist.      Pharynx: Oropharynx is clear.   Eyes:      Extraocular Movements: Extraocular movements intact.      Pupils: Pupils are equal, round, and reactive to light.   Cardiovascular:      Rate and Rhythm: Normal rate. Rhythm irregular.   Pulmonary:      Effort: Pulmonary effort is normal.      Comments: Inspiratory crackles bibasilar  Abdominal:      General: Bowel sounds are normal.      Palpations: Abdomen is soft.   Musculoskeletal:         General: Normal range of motion.      Cervical back: Normal range of motion and neck supple.      Right lower leg: Edema present.      Left lower leg: Edema present.   Skin:     General: Skin is warm and dry.      Capillary Refill: Capillary refill takes less than 2 seconds.   Neurological:      General: No focal deficit present.      Mental Status: He is alert.   Psychiatric:         Mood and Affect: Mood normal.         Behavior: Behavior normal.         ED Course                 Procedures              EKG Interpretation:      Interpreted by Tarun Decker MD  Time reviewed: Time obtained 1034 time interpreted same 88 bpm atrial fibrillation with controlled ventricular response, nonspecific QRS widening anterior fascicular block, nonspecific ST-T wave changes.        Critical Care time:  none               Results for " orders placed or performed during the hospital encounter of 01/13/23 (from the past 24 hour(s))   UA reflex to Microscopic   Result Value Ref Range    Color Urine Yellow Colorless, Straw, Light Yellow, Yellow    Appearance Urine Clear Clear    Glucose Urine Negative Negative mg/dL    Bilirubin Urine Negative Negative    Ketones Urine Negative Negative mg/dL    Specific Gravity Urine 1.015 1.003 - 1.035    Blood Urine Trace (A) Negative    pH Urine 6.5 5.0 - 7.0    Protein Albumin Urine Negative Negative mg/dL    Urobilinogen Urine Normal Normal, 2.0 mg/dL    Nitrite Urine Negative Negative    Leukocyte Esterase Urine Trace (A) Negative    RBC Urine <1 <=2 /HPF    WBC Urine 3 <=5 /HPF    Mucus Urine Present (A) None Seen /LPF   Comprehensive metabolic panel   Result Value Ref Range    Sodium 142 136 - 145 mmol/L    Potassium 4.0 3.4 - 5.3 mmol/L    Chloride 104 98 - 107 mmol/L    Carbon Dioxide (CO2) 28 22 - 29 mmol/L    Anion Gap 10 7 - 15 mmol/L    Urea Nitrogen 17.9 8.0 - 23.0 mg/dL    Creatinine 1.01 0.67 - 1.17 mg/dL    Calcium 9.2 8.8 - 10.2 mg/dL    Glucose 137 (H) 70 - 99 mg/dL    Alkaline Phosphatase 55 40 - 129 U/L    AST 22 10 - 50 U/L    ALT 17 10 - 50 U/L    Protein Total 6.5 6.4 - 8.3 g/dL    Albumin 3.5 3.5 - 5.2 g/dL    Bilirubin Total 0.9 <=1.2 mg/dL    GFR Estimate 81 >60 mL/min/1.73m2   CBC with Platelets & Differential    Narrative    The following orders were created for panel order CBC with Platelets & Differential.  Procedure                               Abnormality         Status                     ---------                               -----------         ------                     CBC with platelets and d...[[id number]]  Abnormal            Final result                 Please view results for these tests on the individual orders.   Nt probnp inpatient   Result Value Ref Range    N terminal Pro BNP Inpatient 4,685 (H) 0 - 900 pg/mL   CBC with platelets and differential   Result Value Ref  Range    WBC Count 8.3 4.0 - 11.0 10e3/uL    RBC Count 4.50 4.40 - 5.90 10e6/uL    Hemoglobin 13.0 (L) 13.3 - 17.7 g/dL    Hematocrit 39.9 (L) 40.0 - 53.0 %    MCV 89 78 - 100 fL    MCH 28.9 26.5 - 33.0 pg    MCHC 32.6 31.5 - 36.5 g/dL    RDW 15.1 (H) 10.0 - 15.0 %    Platelet Count 134 (L) 150 - 450 10e3/uL    % Neutrophils 71 %    % Lymphocytes 18 %    % Monocytes 7 %    % Eosinophils 2 %    % Basophils 1 %    % Immature Granulocytes 1 %    NRBCs per 100 WBC 0 <1 /100    Absolute Neutrophils 6.0 1.6 - 8.3 10e3/uL    Absolute Lymphocytes 1.5 0.8 - 5.3 10e3/uL    Absolute Monocytes 0.6 0.0 - 1.3 10e3/uL    Absolute Eosinophils 0.2 0.0 - 0.7 10e3/uL    Absolute Basophils 0.1 0.0 - 0.2 10e3/uL    Absolute Immature Granulocytes 0.1 <=0.4 10e3/uL    Absolute NRBCs 0.0 10e3/uL   XR Chest 2 Views    Narrative    XR CHEST 2 VIEWS 1/13/2023 12:19 PM    HISTORY: SOA    COMPARISON: 8/13/2016      Impression    IMPRESSION: Linear opacities in the lung bases bilaterally, right  greater than left, may be due to atelectasis, pneumonia or edema. No  pleural effusion or pneumothorax. Median sternotomy and aortic  valvular prosthesis. Stable cardiomegaly. Stable widening of the upper  mediastinum with a tortuous aortic arch.    GREG PLUNKETT MD         SYSTEM ID:  [id number]       Medications   nitroGLYcerin (NITRO-BID) 2 % ointment 15 mg (15 mg Transdermal Patch/Med Applied 1/13/23 1057)   furosemide (LASIX) injection 80 mg (80 mg Intravenous Given 1/13/23 1059)     1:02 PM  Recheck on the patient at this time finds him feeling much better.  He comments that his breathing is better than its been in years at this point, he is voided approximately 2-1/2 L of fluid since receiving the Lasix and the cutaneous nitrate.  He would like to go.  Vitally otherwise stable and acceptable.  I discussed plan with him.  I like to put him on Lasix 40 mg p.o. twice daily once in the morning once early afternoon.  Next 7 to 10 days.  He should then  recheck in clinic with his primary care provider for a repeat BMP to check electrolyte and renal function and also clinical status at that time.  Return criteria for the emergency department reviewed.  Disposition is to home.        Assessments & Plan (with Medical Decision Making)   Assessments and plan with medical decision making at the time stamp above.      Disclaimer: This note consists of symbols derived from keyboarding, dictation and/or voice recognition software. As a result, there may be errors in the script that have gone undetected. Please consider this when interpreting information found in this chart.      I have reviewed the nursing notes.    I have reviewed the findings, diagnosis, plan and need for follow up with the patient.           New Prescriptions    No medications on file       Final diagnoses:   Acute on chronic congestive heart failure, unspecified heart failure type (H)   Atrial fibrillation, unspecified type (H)       1/13/2023   [facility] EMERGENCY DEPT     Tarun Decker MD  01/13/23 2465

## 2023-01-13 NOTE — ED TRIAGE NOTES
Pt here with increased shortness of breath the last couple of weeks. Pt also c/o seeing blood in his urine last week.      Triage Assessment     Row Name 01/13/23 1011       Triage Assessment (Adult)    Airway WDL WDL       Respiratory WDL    Respiratory WDL all    Rhythm/Pattern, Respiratory shortness of breath       Skin Circulation/Temperature WDL    Skin Circulation/Temperature WDL WDL       Cardiac WDL    Cardiac WDL WDL       Peripheral/Neurovascular WDL    Peripheral Neurovascular WDL WDL       Cognitive/Neuro/Behavioral WDL    Cognitive/Neuro/Behavioral WDL WDL

## 2023-01-13 NOTE — ED NOTES
Increased sob since November that is not getting better.   Pt was seen at clinic today and was told to come to ED.   Pt had blood in urine last week.   Urine sample sent today that was clear yellow.